# Patient Record
Sex: FEMALE | Race: WHITE | Employment: OTHER | URBAN - METROPOLITAN AREA
[De-identification: names, ages, dates, MRNs, and addresses within clinical notes are randomized per-mention and may not be internally consistent; named-entity substitution may affect disease eponyms.]

---

## 2017-06-17 ENCOUNTER — HOSPITAL ENCOUNTER (OUTPATIENT)
Dept: MAMMOGRAPHY | Age: 77
Discharge: HOME | End: 2017-06-17
Attending: INTERNAL MEDICINE
Payer: MEDICARE

## 2017-06-17 ENCOUNTER — HOSPITAL ENCOUNTER (OUTPATIENT)
Dept: BONE DENSITY | Age: 77
Discharge: HOME | End: 2017-06-17
Attending: INTERNAL MEDICINE
Payer: MEDICARE

## 2017-06-17 DIAGNOSIS — Z12.31 SCREENING MAMMOGRAM, ENCOUNTER FOR: ICD-10-CM

## 2017-06-17 DIAGNOSIS — M81.0 OSTEOPOROSIS: ICD-10-CM

## 2017-06-17 DIAGNOSIS — Z12.31 ENCOUNTER FOR SCREENING MAMMOGRAM FOR MALIGNANT NEOPLASM OF BREAST: ICD-10-CM

## 2017-06-17 PROCEDURE — 77063 BREAST TOMOSYNTHESIS BI: CPT | Performed by: INTERNAL MEDICINE

## 2017-06-17 PROCEDURE — 77067 SCR MAMMO BI INCL CAD: CPT | Performed by: INTERNAL MEDICINE

## 2017-06-17 PROCEDURE — 77080 DXA BONE DENSITY AXIAL: CPT | Performed by: INTERNAL MEDICINE

## 2017-06-19 NOTE — PROGRESS NOTES
Quick Note:    Please call  Her bone density is better and only shows osteopenia  Please have her stop the fosamax  Continue lifestyle with vitamin D and calcium  ______

## 2018-03-01 ENCOUNTER — OFFICE VISIT (OUTPATIENT)
Dept: FAMILY MEDICINE CLINIC | Facility: CLINIC | Age: 78
End: 2018-03-01

## 2018-03-01 VITALS
TEMPERATURE: 98 F | HEART RATE: 70 BPM | RESPIRATION RATE: 18 BRPM | SYSTOLIC BLOOD PRESSURE: 128 MMHG | DIASTOLIC BLOOD PRESSURE: 60 MMHG | OXYGEN SATURATION: 97 %

## 2018-03-01 DIAGNOSIS — S91.301A WOUND OF RIGHT FOOT: ICD-10-CM

## 2018-03-01 DIAGNOSIS — L84 CALLUS OF FOOT: Primary | ICD-10-CM

## 2018-03-01 PROCEDURE — 90471 IMMUNIZATION ADMIN: CPT | Performed by: PHYSICIAN ASSISTANT

## 2018-03-01 PROCEDURE — 90714 TD VACC NO PRESV 7 YRS+ IM: CPT | Performed by: PHYSICIAN ASSISTANT

## 2018-03-01 PROCEDURE — 99213 OFFICE O/P EST LOW 20 MIN: CPT | Performed by: PHYSICIAN ASSISTANT

## 2018-03-01 RX ORDER — CEPHALEXIN 500 MG/1
500 CAPSULE ORAL 2 TIMES DAILY
Qty: 14 CAPSULE | Refills: 0 | Status: SHIPPED | OUTPATIENT
Start: 2018-03-01 | End: 2018-03-08

## 2018-03-01 NOTE — PATIENT INSTRUCTIONS
-MUST follow up with podiatry as soon as possible  -Updated tetanus   -Will provide keflex, which is an antibiotic given wound and possible hardware in ankle. -Must go to ER with worsening pain, pus, blood, discharge.         Treating Corns and Calluses [address]. [address] [facility], [address] [place] [address]. All rights reserved. This information is not intended as a substitute for professional medical care. Always follow your healthcare professional's instructions.

## 2018-03-01 NOTE — PROGRESS NOTES
CHIEF COMPLAINT:   Patient presents with:  Callus: pt c\o of callus on rt foot       HPI:     Christopher Song is a 65year old female who presents with concerns of wound to her right bottom foot for 14 days.   Patient explains she has had a callous on her f daily. Disp: 90 capsule Rfl: 3   Calcium 600 MG Oral Tab Take 2 tabs daily for a total of 1,200 mg daily Disp: 2 tablet Rfl: 0   WOMENS ONE DAILY OR TABS 1 TABLET DAILY Disp:  Rfl:       Past Medical History:   Diagnosis Date   • Disorder of bone and carti Skin care discussed with patient.  -Discussed with grandson and patient that patient needs to be seeing the podiatrist.  -Updated tetanus   - Although lower suspicion for true bacterial infection, given ongoing pain and minimal swelling/erythema and possib parts of your foot may be under too much pressure. This can cause severe corns and calluses. In such cases, surgery may be the best way to correct the problem.   Outpatient procedures  In most cases, surgery to improve bone position is an outpatient procedu

## 2020-03-31 PROBLEM — L97.519 ULCER OF RIGHT FOOT, UNSPECIFIED ULCER STAGE (HCC): Status: ACTIVE | Noted: 2020-03-31

## 2020-03-31 PROBLEM — E66.01 MORBID OBESITY WITH BODY MASS INDEX (BMI) OF 40.0 TO 44.9 IN ADULT (HCC): Status: ACTIVE | Noted: 2020-03-31

## 2021-04-07 PROBLEM — L97.519 ULCER OF RIGHT FOOT, UNSPECIFIED ULCER STAGE (HCC): Status: RESOLVED | Noted: 2020-03-31 | Resolved: 2021-04-07

## 2021-06-08 ENCOUNTER — APPOINTMENT (OUTPATIENT)
Dept: GENERAL RADIOLOGY | Age: 81
End: 2021-06-08
Attending: EMERGENCY MEDICINE
Payer: MEDICARE

## 2021-06-08 ENCOUNTER — HOSPITAL ENCOUNTER (EMERGENCY)
Age: 81
Discharge: HOME | End: 2021-06-08
Attending: EMERGENCY MEDICINE
Payer: MEDICARE

## 2021-06-08 ENCOUNTER — APPOINTMENT (OUTPATIENT)
Dept: CT IMAGING | Age: 81
End: 2021-06-08
Attending: EMERGENCY MEDICINE
Payer: MEDICARE

## 2021-06-08 VITALS
SYSTOLIC BLOOD PRESSURE: 138 MMHG | HEART RATE: 80 BPM | BODY MASS INDEX: 46.07 KG/M2 | HEIGHT: 63 IN | DIASTOLIC BLOOD PRESSURE: 55 MMHG | TEMPERATURE: 99 F | OXYGEN SATURATION: 98 % | RESPIRATION RATE: 16 BRPM | WEIGHT: 260 LBS

## 2021-06-08 DIAGNOSIS — S22.31XA CLOSED FRACTURE OF ONE RIB OF RIGHT SIDE, INITIAL ENCOUNTER: ICD-10-CM

## 2021-06-08 DIAGNOSIS — S00.03XA CONTUSION OF SCALP, INITIAL ENCOUNTER: ICD-10-CM
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
## 2021-06-08 PROCEDURE — 71101 X-RAY EXAM UNILAT RIBS/CHEST: CPT | Performed by: EMERGENCY MEDICINE

## 2021-06-08 PROCEDURE — 70450 CT HEAD/BRAIN W/O DYE: CPT | Performed by: EMERGENCY MEDICINE

## 2021-06-08 PROCEDURE — 99284 EMERGENCY DEPT VISIT MOD MDM: CPT

## 2021-06-08 RX ORDER — LIDOCAINE 4 G/G
1 PATCH TOPICAL EVERY 24 HOURS
Qty: 10 PATCH | Refills: 0 | Status: ON HOLD | OUTPATIENT
Start: 2021-06-08 | End: 2022-01-03

## 2021-06-09 NOTE — ED INITIAL ASSESSMENT (HPI)
Pt in er for evaluation post fall today, reports bruising to her head, right arm, and pain to her right ribs

## 2021-06-09 NOTE — ED PROVIDER NOTES
Patient Seen in: THE [facility] Emergency Department In [place]      History   Patient presents with:  Fall    Stated Complaint: fall, head injury, rib injury    HPI/Subjective:   HPI    Patient presents after a mechanical fall earlier today.   She apparently Device None (Room air)       Current:/57   Pulse 76   Temp 98.8 °F (37.1 °C) (Oral)   Resp 20   Ht 160 cm (5' 3\")   Wt 117.9 kg   SpO2 96%   BMI 46.06 kg/m²         Physical Exam    General: Well-developed, severely overweight, (BMI over 45); minima Impression:  Fall, initial encounter  (primary encounter diagnosis)  Contusion of scalp, initial encounter  Closed fracture of one rib of right side, initial encounter     Disposition:  Discharge  6/8/2021 10:28 pm    Follow-up:  No follow-up provider spec

## 2022-01-02 ENCOUNTER — APPOINTMENT (OUTPATIENT)
Dept: GENERAL RADIOLOGY | Facility: HOSPITAL | Age: 82
DRG: 871 | End: 2022-01-02
Attending: EMERGENCY MEDICINE
Payer: MEDICARE

## 2022-01-02 ENCOUNTER — HOSPITAL ENCOUNTER (INPATIENT)
Facility: HOSPITAL | Age: 82
LOS: 8 days | Discharge: NURSING, REHAB OR LONG-TERM CARE | DRG: 871 | End: 2022-01-11
Attending: EMERGENCY MEDICINE | Admitting: HOSPITALIST
Payer: MEDICARE

## 2022-01-02 DIAGNOSIS — I95.9 HYPOTENSION, UNSPECIFIED HYPOTENSION TYPE: Primary | ICD-10-CM

## 2022-01-02 DIAGNOSIS — U07.1 COVID-19: ICD-10-CM

## 2022-01-02 LAB
ALBUMIN SERPL-MCNC: 2.8 G/DL (ref 3.4–5)
ALBUMIN/GLOB SERPL: 0.5 {RATIO} (ref 1–2)
ALP LIVER SERPL-CCNC: 148 U/L
ALT SERPL-CCNC: 43 U/L
ANION GAP SERPL CALC-SCNC: 10 MMOL/L (ref 0–18)
AST SERPL-CCNC: 138 U/L (ref 15–37)
BASOPHILS # BLD AUTO: 0.01 X10(3) UL (ref 0–0.2)
BASOPHILS NFR BLD AUTO: 0.2 %
BILIRUB SERPL-MCNC: 0.7 MG/DL (ref 0.1–2)
BILIRUB UR QL CFM: NEGATIVE
BUN BLD-MCNC: 29 MG/DL (ref 7–18)
CALCIUM BLD-MCNC: 9.4 MG/DL (ref 8.5–10.1)
CHLORIDE SERPL-SCNC: 93 MMOL/L (ref 98–112)
CLARITY UR REFRACT.AUTO: CLEAR
CO2 SERPL-SCNC: 25 MMOL/L (ref 21–32)
CREAT BLD-MCNC: 1.97 MG/DL
EOSINOPHIL # BLD AUTO: 0 X10(3) UL (ref 0–0.7)
EOSINOPHIL NFR BLD AUTO: 0 %
ERYTHROCYTE [DISTWIDTH] IN BLOOD BY AUTOMATED COUNT: 14.2 %
GLOBULIN PLAS-MCNC: 5.2 G/DL (ref 2.8–4.4)
GLUCOSE BLD-MCNC: 98 MG/DL (ref 70–99)
GLUCOSE UR STRIP.AUTO-MCNC: NEGATIVE MG/DL
HCT VFR BLD AUTO: 44.7 %
HGB BLD-MCNC: 15 G/DL
IMM GRANULOCYTES # BLD AUTO: 0.03 X10(3) UL (ref 0–1)
IMM GRANULOCYTES NFR BLD: 0.6 %
KETONES UR STRIP.AUTO-MCNC: NEGATIVE MG/DL
LACTATE SERPL-SCNC: 3 MMOL/L (ref 0.4–2)
LEUKOCYTE ESTERASE UR QL STRIP.AUTO: NEGATIVE
LYMPHOCYTES # BLD AUTO: 1.08 X10(3) UL (ref 1–4)
LYMPHOCYTES NFR BLD AUTO: 19.9 %
MCH RBC QN AUTO: 30.7 PG (ref 26–34)
MCHC RBC AUTO-ENTMCNC: 33.6 G/DL (ref 31–37)
MCV RBC AUTO: 91.6 FL
MONOCYTES # BLD AUTO: 0.6 X10(3) UL (ref 0.1–1)
MONOCYTES NFR BLD AUTO: 11 %
NEUTROPHILS # BLD AUTO: 3.72 X10 (3) UL (ref 1.5–7.7)
NEUTROPHILS # BLD AUTO: 3.72 X10(3) UL (ref 1.5–7.7)
NEUTROPHILS NFR BLD AUTO: 68.3 %
NITRITE UR QL STRIP.AUTO: NEGATIVE
OSMOLALITY SERPL CALC.SUM OF ELEC: 272 MOSM/KG (ref 275–295)
PH UR STRIP.AUTO: 5 [PH] (ref 5–8)
PLATELET # BLD AUTO: 163 10(3)UL (ref 150–450)
POTASSIUM SERPL-SCNC: 4.4 MMOL/L (ref 3.5–5.1)
PROT SERPL-MCNC: 8 G/DL (ref 6.4–8.2)
PROT UR STRIP.AUTO-MCNC: NEGATIVE MG/DL
RBC # BLD AUTO: 4.88 X10(6)UL
RBC UR QL AUTO: NEGATIVE
SARS-COV-2 RNA RESP QL NAA+PROBE: DETECTED
SODIUM SERPL-SCNC: 128 MMOL/L (ref 136–145)
SP GR UR STRIP.AUTO: 1.02 (ref 1–1.03)
TROPONIN I HIGH SENSITIVITY: 23 NG/L
UROBILINOGEN UR STRIP.AUTO-MCNC: 4 MG/DL
WBC # BLD AUTO: 5.4 X10(3) UL (ref 4–11)

## 2022-01-02 PROCEDURE — 71045 X-RAY EXAM CHEST 1 VIEW: CPT | Performed by: EMERGENCY MEDICINE

## 2022-01-03 ENCOUNTER — APPOINTMENT (OUTPATIENT)
Dept: ULTRASOUND IMAGING | Facility: HOSPITAL | Age: 82
DRG: 871 | End: 2022-01-03
Attending: NURSE PRACTITIONER
Payer: MEDICARE

## 2022-01-03 ENCOUNTER — APPOINTMENT (OUTPATIENT)
Dept: CT IMAGING | Facility: HOSPITAL | Age: 82
DRG: 871 | End: 2022-01-03
Attending: EMERGENCY MEDICINE
Payer: MEDICARE

## 2022-01-03 PROBLEM — I95.9 HYPOTENSION, UNSPECIFIED HYPOTENSION TYPE: Status: ACTIVE | Noted: 2022-01-03

## 2022-01-03 PROBLEM — U07.1 COVID-19: Status: ACTIVE | Noted: 2022-01-03

## 2022-01-03 PROBLEM — I95.9 HYPOTENSION: Status: ACTIVE | Noted: 2022-01-03

## 2022-01-03 LAB
ALBUMIN SERPL-MCNC: 2.2 G/DL (ref 3.4–5)
ALBUMIN/GLOB SERPL: 0.6 {RATIO} (ref 1–2)
ALP LIVER SERPL-CCNC: 120 U/L
ALT SERPL-CCNC: 34 U/L
ANION GAP SERPL CALC-SCNC: 7 MMOL/L (ref 0–18)
APTT PPP: 183.8 SECONDS (ref 23.3–35.6)
APTT PPP: 33.2 SECONDS (ref 23.3–35.6)
APTT PPP: >300 SECONDS (ref 23.3–35.6)
APTT PPP: >300 SECONDS (ref 23.3–35.6)
AST SERPL-CCNC: 111 U/L (ref 15–37)
BASOPHILS # BLD AUTO: 0.01 X10(3) UL (ref 0–0.2)
BASOPHILS NFR BLD AUTO: 0.2 %
BILIRUB SERPL-MCNC: 0.5 MG/DL (ref 0.1–2)
BUN BLD-MCNC: 26 MG/DL (ref 7–18)
CALCIUM BLD-MCNC: 7.9 MG/DL (ref 8.5–10.1)
CHLORIDE SERPL-SCNC: 101 MMOL/L (ref 98–112)
CK SERPL-CCNC: 257 U/L
CO2 SERPL-SCNC: 22 MMOL/L (ref 21–32)
CREAT BLD-MCNC: 1.44 MG/DL
CRP SERPL-MCNC: 4.99 MG/DL (ref ?–0.3)
CRP SERPL-MCNC: 5.04 MG/DL (ref ?–0.3)
D DIMER PPP FEU-MCNC: 1.17 UG/ML FEU (ref ?–0.82)
D DIMER PPP FEU-MCNC: 1.53 UG/ML FEU (ref ?–0.82)
DEPRECATED HBV CORE AB SER IA-ACNC: 638.7 NG/ML
DEPRECATED HBV CORE AB SER IA-ACNC: 657.8 NG/ML
EOSINOPHIL # BLD AUTO: 0 X10(3) UL (ref 0–0.7)
EOSINOPHIL NFR BLD AUTO: 0 %
ERYTHROCYTE [DISTWIDTH] IN BLOOD BY AUTOMATED COUNT: 14 %
GLOBULIN PLAS-MCNC: 3.8 G/DL (ref 2.8–4.4)
GLUCOSE BLD-MCNC: 98 MG/DL (ref 70–99)
HCT VFR BLD AUTO: 38.1 %
HGB BLD-MCNC: 12.7 G/DL
IMM GRANULOCYTES # BLD AUTO: 0.02 X10(3) UL (ref 0–1)
IMM GRANULOCYTES NFR BLD: 0.5 %
INR BLD: 1.11 (ref 0.8–1.2)
L PNEUMO AG UR QL: NEGATIVE
LACTATE SERPL-SCNC: 1.2 MMOL/L (ref 0.4–2)
LDH SERPL L TO P-CCNC: 317 U/L
LDH SERPL L TO P-CCNC: 317 U/L
LYMPHOCYTES # BLD AUTO: 1.41 X10(3) UL (ref 1–4)
LYMPHOCYTES NFR BLD AUTO: 34.6 %
MCH RBC QN AUTO: 30.9 PG (ref 26–34)
MCHC RBC AUTO-ENTMCNC: 33.3 G/DL (ref 31–37)
MCV RBC AUTO: 92.7 FL
MONOCYTES # BLD AUTO: 0.54 X10(3) UL (ref 0.1–1)
MONOCYTES NFR BLD AUTO: 13.3 %
MRSA DNA SPEC QL NAA+PROBE: NEGATIVE
NEUTROPHILS # BLD AUTO: 2.09 X10 (3) UL (ref 1.5–7.7)
NEUTROPHILS # BLD AUTO: 2.09 X10(3) UL (ref 1.5–7.7)
NEUTROPHILS NFR BLD AUTO: 51.4 %
NT-PROBNP SERPL-MCNC: 408 PG/ML (ref ?–450)
OSMOLALITY SERPL CALC.SUM OF ELEC: 275 MOSM/KG (ref 275–295)
PLATELET # BLD AUTO: 125 10(3)UL (ref 150–450)
POTASSIUM SERPL-SCNC: 4.2 MMOL/L (ref 3.5–5.1)
PROCALCITONIN SERPL-MCNC: 1.29 NG/ML (ref ?–0.16)
PROT SERPL-MCNC: 6 G/DL (ref 6.4–8.2)
PROTHROMBIN TIME: 14.3 SECONDS (ref 11.6–14.8)
RBC # BLD AUTO: 4.11 X10(6)UL
SODIUM SERPL-SCNC: 130 MMOL/L (ref 136–145)
STREP PNEUMO ANTIGEN, URINE: NEGATIVE
VANCOMYCIN PEAK SERPL-MCNC: 23.8 UG/ML (ref 30–50)
WBC # BLD AUTO: 4.1 X10(3) UL (ref 4–11)

## 2022-01-03 PROCEDURE — 5A0945A ASSISTANCE WITH RESPIRATORY VENTILATION, 24-96 CONSECUTIVE HOURS, HIGH NASAL FLOW/VELOCITY: ICD-10-PCS | Performed by: HOSPITALIST

## 2022-01-03 PROCEDURE — XW033E5 INTRODUCTION OF REMDESIVIR ANTI-INFECTIVE INTO PERIPHERAL VEIN, PERCUTANEOUS APPROACH, NEW TECHNOLOGY GROUP 5: ICD-10-PCS | Performed by: INTERNAL MEDICINE

## 2022-01-03 PROCEDURE — 70450 CT HEAD/BRAIN W/O DYE: CPT | Performed by: EMERGENCY MEDICINE

## 2022-01-03 PROCEDURE — 93970 EXTREMITY STUDY: CPT | Performed by: NURSE PRACTITIONER

## 2022-01-03 PROCEDURE — 99291 CRITICAL CARE FIRST HOUR: CPT | Performed by: NURSE PRACTITIONER

## 2022-01-03 RX ORDER — HEPARIN SODIUM AND DEXTROSE 10000; 5 [USP'U]/100ML; G/100ML
18 INJECTION INTRAVENOUS ONCE
Status: DISCONTINUED | OUTPATIENT
Start: 2022-01-03 | End: 2022-01-03

## 2022-01-03 RX ORDER — HEPARIN SODIUM AND DEXTROSE 10000; 5 [USP'U]/100ML; G/100ML
INJECTION INTRAVENOUS CONTINUOUS
Status: DISCONTINUED | OUTPATIENT
Start: 2022-01-03 | End: 2022-01-03

## 2022-01-03 RX ORDER — VANCOMYCIN/0.9 % SOD CHLORIDE 1.75 G/5
25 PLASTIC BAG, INJECTION (ML) INTRAVENOUS ONCE
Status: COMPLETED | OUTPATIENT
Start: 2022-01-03 | End: 2022-01-03

## 2022-01-03 RX ORDER — OXYBUTYNIN CHLORIDE 5 MG/1
15 TABLET, EXTENDED RELEASE ORAL DAILY
Status: DISCONTINUED | OUTPATIENT
Start: 2022-01-03 | End: 2022-01-11

## 2022-01-03 RX ORDER — ONDANSETRON 2 MG/ML
4 INJECTION INTRAMUSCULAR; INTRAVENOUS EVERY 6 HOURS PRN
Status: DISCONTINUED | OUTPATIENT
Start: 2022-01-03 | End: 2022-01-11

## 2022-01-03 RX ORDER — SODIUM CHLORIDE 9 MG/ML
1000 INJECTION, SOLUTION INTRAVENOUS ONCE
Status: COMPLETED | OUTPATIENT
Start: 2022-01-03 | End: 2022-01-03

## 2022-01-03 RX ORDER — SALIVA STIMULANT COMB. NO.3
SPRAY, NON-AEROSOL (ML) MUCOUS MEMBRANE AS NEEDED
Status: DISCONTINUED | OUTPATIENT
Start: 2022-01-03 | End: 2022-01-11

## 2022-01-03 RX ORDER — LEVOTHYROXINE SODIUM 0.1 MG/1
100 TABLET ORAL DAILY
Status: DISCONTINUED | OUTPATIENT
Start: 2022-01-03 | End: 2022-01-11

## 2022-01-03 RX ORDER — BENZONATATE 200 MG/1
200 CAPSULE ORAL 3 TIMES DAILY PRN
Status: DISCONTINUED | OUTPATIENT
Start: 2022-01-03 | End: 2022-01-11

## 2022-01-03 RX ORDER — ACETAMINOPHEN 325 MG/1
650 TABLET ORAL EVERY 6 HOURS PRN
Status: DISCONTINUED | OUTPATIENT
Start: 2022-01-03 | End: 2022-01-11

## 2022-01-03 RX ORDER — PANTOPRAZOLE SODIUM 20 MG/1
20 TABLET, DELAYED RELEASE ORAL
Refills: 3 | Status: DISCONTINUED | OUTPATIENT
Start: 2022-01-04 | End: 2022-01-11

## 2022-01-03 RX ORDER — HEPARIN SODIUM 5000 [USP'U]/ML
5000 INJECTION, SOLUTION INTRAVENOUS; SUBCUTANEOUS EVERY 8 HOURS SCHEDULED
Status: DISCONTINUED | OUTPATIENT
Start: 2022-01-03 | End: 2022-01-03

## 2022-01-03 RX ORDER — HEPARIN SODIUM AND DEXTROSE 10000; 5 [USP'U]/100ML; G/100ML
INJECTION INTRAVENOUS CONTINUOUS
Status: DISCONTINUED | OUTPATIENT
Start: 2022-01-03 | End: 2022-01-05

## 2022-01-03 RX ORDER — HEPARIN SODIUM AND DEXTROSE 10000; 5 [USP'U]/100ML; G/100ML
18 INJECTION INTRAVENOUS ONCE
Status: DISCONTINUED | OUTPATIENT
Start: 2022-01-03 | End: 2022-01-05

## 2022-01-03 RX ORDER — DULOXETIN HYDROCHLORIDE 30 MG/1
60 CAPSULE, DELAYED RELEASE ORAL DAILY
Status: DISCONTINUED | OUTPATIENT
Start: 2022-01-03 | End: 2022-01-11

## 2022-01-03 RX ORDER — ALBUTEROL SULFATE 90 UG/1
4 AEROSOL, METERED RESPIRATORY (INHALATION) EVERY 4 HOURS PRN
Status: DISCONTINUED | OUTPATIENT
Start: 2022-01-03 | End: 2022-01-11

## 2022-01-03 NOTE — PROGRESS NOTES
ICU  Critical Care APRN Progress Note    NAME: Jenise Gosselin - ROOM: 98999-D - MRN: [id number] - Age: 79year old - :1940    History Of Present Illness:  Jenise Gosselin is a 79year old female with PMHx significant for HTN, hypothyroidism and os auscultation bilaterally, respirations unlabored  Heart: Regular rate and rhythm, S1 and S2 normal, no murmur, rub or gallop  Abdomen: Soft, non-tender, bowel sounds active all four quadrants, no masses, no organomegaly  Extremities: Extremities normal, at consult  -Hold on steroids with normal oxygenation  -Symptoms started 10 days ago  -Monitor inflammatory markers    3. Elevated Ddimer  -Consider CTA  -Heparin gtt    4.  NAJMA  -Likely from dehydration from poor PO intake  -IVF bolus given in ED  -Monitor uo

## 2022-01-03 NOTE — ED PROVIDER NOTES
Patient Seen in: [facility] Emergency Department      History   Patient presents with:  Dehydration    Stated Complaint: possible exposure to covid, fever 99.6, weakness, started before estephania. vern*    Subjective:   HPI    Patient is an 81-year-o Pulse 61   Temp 98.4 °F (36.9 °C) (Oral)   Resp 26   Ht 162.6 cm (5' 4\")   Wt 104.3 kg   SpO2 99%   BMI 39.48 kg/m²         Physical Exam      Constitutional: Awake, alert, age appearing, non-toxic  Head: Normocephalic and atraumatic.      Eyes: EOM are n Notable for the following components:    C-Reactive Protein 4.99 (*)     All other components within normal limits   FERRITIN - Abnormal; Notable for the following components:    Ferritin 657.8 (*)     All other components within normal limits   PROCALCITO significant NAJMA with her creatinine doubled from baseline. At this point, will hold off on doing CT angiogram (D-dimer was not ordered to restratify for PE as it is). Admitting team to assess need for CT angiogram tomorrow, based on her kidney status. 10 of fevers, weakness and several days of confusion now. She is Covid positive. Hypotensive on arrival but fluid responsive. Suspect that her hypotension is largely from being volume depleted but  septic shock not ruled out.       Given her Covid posi

## 2022-01-03 NOTE — ED INITIAL ASSESSMENT (HPI)
Pt states low pulse ox at home, loose stools, pt loosing control of bladder. Pt with fevers at home. Family noting foul urine odor.

## 2022-01-03 NOTE — PROGRESS NOTES
[facility] Dosing Service    Initial Pharmacokinetic Consult for Vancomycin AUC Dosing    Marybel Tuttle is a 79year old patient who is being treated for pneumonia. Pharmacy has been asked to dose vancomycin by Israel Moraes.     Weights:  Ideal body

## 2022-01-03 NOTE — PLAN OF CARE
Assumed care of pt after RN report. Alert x3, confused on time. Remains on room air. Denies any difficulties breathing or chest pain. Only complaint is a sore throat, lozenges and biotene spray PRN. VSS. Afib, rate controlled. Tolerating diet.  Incontinent

## 2022-01-03 NOTE — H&P
General Medicine H&P     Patient presents with:  Dehydration       PCP: Dipika Cueva MD    History of Present Illness: Patient is a 79year old female with PMH including but not limited to HTN, HT who p/t [facility] ED c fever, weakness, found ot have covi anicteric, EOMs intact. Nose: Nares normal,  Mucosa normal    Throat: Lips normal   Neck: Supple, symmetrical, trachea midline   Lungs:   Clear to auscultation bilaterally.  Normal effort   Chest wall:  No tenderness or deformity   Heart:  Regular rate a fever, COVID+    FINDINGS:  Mild diffuse volume loss. There is no significant parenchymal attenuation abnormality. There is no midline shift or mass-effect. The basal cisterns are patent. The gray-white matter differentiation is intact.   There is no ac with other etiologies not  entirely excluded. Clinical correlation recommended. Possible small left pleural effusion.      Dictated by (CST): Dejuan Cr MD on 1/02/2022 at 11:50 PM     Finalized by (CST): Dejuan Cr MD on 1/02/2022 at 11:50 PM

## 2022-01-03 NOTE — PLAN OF CARE
Received pt from ED AOX 2 to self, place and situation. Pt on 2L oxygen via nasal cannula later transitioned to room air. Pt on fluids 125 ml/hr later discontinued by APN. On heparin drip 1800 unit/kg/hr ( 18ml),  Vanco and Azithromycin given per MAR.   Pt period  Description: INTERVENTIONS  - Monitor WBC  - Administer growth factors as ordered  - Implement neutropenic guidelines  Outcome: Not Progressing

## 2022-01-03 NOTE — PROGRESS NOTES
Pharmacy note re: Zosyn dose adjustment for obesity:    The Dose of Zosyn was adjusted from 3.375 gm to 4.5 gm as patient's body wt > 100 kg. Actual wt= 104 kg. Thank you,  Mallory Cr, Pharm.  D  X [phone]

## 2022-01-03 NOTE — PROGRESS NOTES
[facility] Pharmacy Note:  Renal Adjustment for piperacillin/tazobactam (ZOSYN)    Oliva Yao is a 79year old patient who has been prescribed piperacillin/tazobactam (ZOSYN) 3.375 g every12 hrs.   The estimated creatinine clearance is 26 mL/min (A) (based on

## 2022-01-03 NOTE — ED QUICK NOTES
Pt returned to room from CT, she is resting in bed. Pt denies pain, she denies ED, \"it's just this mask that makes it hard. \" Pt alert, oriented x3. States she gets up with a walker or cane at home. Pt denies need at this time.  No cough noted

## 2022-01-03 NOTE — PROGRESS NOTES
[facility] Dosing Service    Initial Pharmacokinetic Consult for Vancomycin AUC Dosing    Jeferson Ryan is a 79year old patient who is being treated for pneumonia. Pharmacy has been asked to dose vancomycin by Scot Fontenot.     Weights:  Ideal body

## 2022-01-03 NOTE — CONSULTS
[facility]  TEL: [phone]  FAX: [phone]    Alma Heller Patient Status:  Inpatient    1940 MRN [id number]   [facility] 4SW-A Attending Brooke Nelson,*   Hosp Day # 0 PCP Eder Pan DOSE, 18 Units/kg/hr, Intravenous, Once  •  heparin (PORCINE) drip 13817lkmcg/250mL infusion CONTINUOUS, 200-3,000 Units/hr, Intravenous, Continuous  •  azithromycin (ZITHROMAX) 500 mg in sodium chloride 0.9% 250 mL IVPB, 500 mg, Intravenous, Q24H  •  vaso bruits. Respiratory: Clear to auscultation bilaterally. No wheezes. No rhonchi. Cardiovascular: S1, S2.  Regular rate and rhythm. No murmurs. Abdomen: Soft, nontender, nondistended. Positive bowel sounds.   Musculoskeletal: Full range of motion of all steroids. Still feels fatigued. Some sore throat mainly is her complaint. Was hypotensive initially. This seems to be proved. On heparin drip. ddimer 1.17    ASSESSMENT:    #Covid pneumonia. Mild hypoxemia on presentation. Ferritin 638.   Partially v

## 2022-01-03 NOTE — CONSULTS
[facility] Patient Status:  Inpatient    1940 MRN [id number]   [facility] 4SW-A Attending Wang Hayden   Hosp Day # 0 PCP Navneet Ye MD     Date of Admission: 2022  Admission Diagnosis: daily., Disp: 90 tablet, Rfl: 3  DULoxetine HCl 60 MG Oral Cap DR Particles, Take 1 capsule (60 mg total) by mouth daily. , Disp: 90 capsule, Rfl: 3  amLODIPine Besy-Benazepril HCl 5-10 MG Oral Cap, Take 1 capsule by mouth daily. , Disp: 90 capsule, Rfl: 3 palpitations, edema, orthopnea, or syncope  Respiratory: denies SOB, dyspnea on exertion, denies cough, hemoptysis, wheezing, pleurisy  GI: denies nausea, vomiting, diarrhea, constipation, melena, abdominal pain  : denies hematuria, dysuria, hesitancy, o RBC 4.11 01/03/2022    HGB 12.7 01/03/2022    HCT 38.1 01/03/2022    MCV 92.7 01/03/2022    MCH 30.9 01/03/2022    MCHC 33.3 01/03/2022    RDW 14.0 01/03/2022    .0 01/03/2022     Lab Results   Component Value Date     01/03/2022    K 4.2 01/0 4. NAJMA: prerenal  - improving with IVF   5. Proph:   - lovenox  6.  Dispo:  - will follow   - stable to transfer to the floor     Nick Bradford MD  1/3/2022  12:25 PM

## 2022-01-03 NOTE — ED QUICK NOTES
Spoke with daughter Hany Wood via phone who states patient has had low grade fever on and off since Jeovanny. Daughter and grandson that live with patient have also been sick. Family also states that patient fell 2 days ago and hit her head, no LOC.   Medics d

## 2022-01-03 NOTE — PROGRESS NOTES
[facility] Pharmacy Note: Antimicrobial Weight Based & Renal Dose Adjustment for: piperacillin/tazobactam (Dudley Nava)    Kate Aguayo is a 79year old patient who has been prescribed piperacillin/tazobactam (ZOSYN) 4.5 gm every 8 hours.     Estimated Creatinine Nish

## 2022-01-04 ENCOUNTER — APPOINTMENT (OUTPATIENT)
Dept: CT IMAGING | Facility: HOSPITAL | Age: 82
DRG: 871 | End: 2022-01-04
Attending: INTERNAL MEDICINE
Payer: MEDICARE

## 2022-01-04 LAB
ALBUMIN SERPL-MCNC: 2.2 G/DL (ref 3.4–5)
ALBUMIN/GLOB SERPL: 0.6 {RATIO} (ref 1–2)
ALP LIVER SERPL-CCNC: 123 U/L
ALT SERPL-CCNC: 30 U/L
ANION GAP SERPL CALC-SCNC: 8 MMOL/L (ref 0–18)
APTT PPP: 115 SECONDS (ref 23.3–35.6)
APTT PPP: 233.7 SECONDS (ref 23.3–35.6)
APTT PPP: 84.9 SECONDS (ref 23.3–35.6)
AST SERPL-CCNC: 92 U/L (ref 15–37)
BASOPHILS # BLD AUTO: 0.02 X10(3) UL (ref 0–0.2)
BASOPHILS NFR BLD AUTO: 0.5 %
BILIRUB SERPL-MCNC: 0.5 MG/DL (ref 0.1–2)
BUN BLD-MCNC: 19 MG/DL (ref 7–18)
CALCIUM BLD-MCNC: 8 MG/DL (ref 8.5–10.1)
CHLORIDE SERPL-SCNC: 104 MMOL/L (ref 98–112)
CO2 SERPL-SCNC: 23 MMOL/L (ref 21–32)
CREAT BLD-MCNC: 0.89 MG/DL
CRP SERPL-MCNC: 5.41 MG/DL (ref ?–0.3)
D DIMER PPP FEU-MCNC: 0.85 UG/ML FEU (ref ?–0.82)
DEPRECATED HBV CORE AB SER IA-ACNC: 518.2 NG/ML
EOSINOPHIL # BLD AUTO: 0.03 X10(3) UL (ref 0–0.7)
EOSINOPHIL NFR BLD AUTO: 0.8 %
ERYTHROCYTE [DISTWIDTH] IN BLOOD BY AUTOMATED COUNT: 14.1 %
GLOBULIN PLAS-MCNC: 3.9 G/DL (ref 2.8–4.4)
GLUCOSE BLD-MCNC: 66 MG/DL (ref 70–99)
GLUCOSE BLD-MCNC: 67 MG/DL (ref 70–99)
GLUCOSE BLD-MCNC: 89 MG/DL (ref 70–99)
GLUCOSE BLD-MCNC: 92 MG/DL (ref 70–99)
GLUCOSE BLD-MCNC: 99 MG/DL (ref 70–99)
HAV IGM SER QL: NONREACTIVE
HBV CORE IGM SER QL: NONREACTIVE
HBV SURFACE AG SERPL QL IA: NONREACTIVE
HCT VFR BLD AUTO: 40.2 %
HCV AB SERPL QL IA: NONREACTIVE
HGB BLD-MCNC: 13.1 G/DL
IMM GRANULOCYTES # BLD AUTO: 0.03 X10(3) UL (ref 0–1)
IMM GRANULOCYTES NFR BLD: 0.8 %
LDH SERPL L TO P-CCNC: 331 U/L
LYMPHOCYTES # BLD AUTO: 1.28 X10(3) UL (ref 1–4)
LYMPHOCYTES NFR BLD AUTO: 32 %
MAGNESIUM SERPL-MCNC: 1.6 MG/DL (ref 1.6–2.6)
MCH RBC QN AUTO: 29.8 PG (ref 26–34)
MCHC RBC AUTO-ENTMCNC: 32.6 G/DL (ref 31–37)
MCV RBC AUTO: 91.4 FL
MONOCYTES # BLD AUTO: 0.33 X10(3) UL (ref 0.1–1)
MONOCYTES NFR BLD AUTO: 8.3 %
NEUTROPHILS # BLD AUTO: 2.31 X10 (3) UL (ref 1.5–7.7)
NEUTROPHILS # BLD AUTO: 2.31 X10(3) UL (ref 1.5–7.7)
NEUTROPHILS NFR BLD AUTO: 57.6 %
OSMOLALITY SERPL CALC.SUM OF ELEC: 281 MOSM/KG (ref 275–295)
PHOSPHATE SERPL-MCNC: 2.4 MG/DL (ref 2.5–4.9)
PLATELET # BLD AUTO: 157 10(3)UL (ref 150–450)
POTASSIUM SERPL-SCNC: 4.1 MMOL/L (ref 3.5–5.1)
PROT SERPL-MCNC: 6.1 G/DL (ref 6.4–8.2)
RBC # BLD AUTO: 4.4 X10(6)UL
SODIUM SERPL-SCNC: 135 MMOL/L (ref 136–145)
WBC # BLD AUTO: 4 X10(3) UL (ref 4–11)

## 2022-01-04 PROCEDURE — 71275 CT ANGIOGRAPHY CHEST: CPT | Performed by: INTERNAL MEDICINE

## 2022-01-04 PROCEDURE — 3E0333Z INTRODUCTION OF ANTI-INFLAMMATORY INTO PERIPHERAL VEIN, PERCUTANEOUS APPROACH: ICD-10-PCS | Performed by: INTERNAL MEDICINE

## 2022-01-04 PROCEDURE — XW0DXM6 INTRODUCTION OF BARICITINIB INTO MOUTH AND PHARYNX, EXTERNAL APPROACH, NEW TECHNOLOGY GROUP 6: ICD-10-PCS | Performed by: INTERNAL MEDICINE

## 2022-01-04 RX ORDER — GUAIFENESIN 600 MG
600 TABLET, EXTENDED RELEASE 12 HR ORAL 2 TIMES DAILY
Status: DISCONTINUED | OUTPATIENT
Start: 2022-01-04 | End: 2022-01-11

## 2022-01-04 RX ORDER — DEXAMETHASONE SODIUM PHOSPHATE 4 MG/ML
6 VIAL (ML) INJECTION EVERY 24 HOURS
Status: DISCONTINUED | OUTPATIENT
Start: 2022-01-04 | End: 2022-01-08

## 2022-01-04 NOTE — PAYOR COMM NOTE
--------------  CONTINUED STAY REVIEW    Payor: Gideon Salas [facility] PPO  Subscriber #:  [id number]  Authorization Number: [id number]    Admit date: 1/3/22  Admit time:  3:06 AM    Admitting Physician: Carlos Eduardo Green MD  Attending Physician:  Julio Cesar Nina Given 100 mcg Oral Jose Brooks RN      Oxybutynin Chloride ER (DITROPAN-XL) 24 hr tab 15 mg     Date Action Dose Route User    1/4/2022 0902 Given 15 mg Oral Gucci Hooper RN    1/3/2022 1554 Given 15 mg Oral Jose Brooks RN      pantoprazole 59 25 112/51 92 % — — — CAROL    01/03/22 0900 — 61 23 104/47 93 % — — — CAROL    01/03/22 0800 99.5 °F (37.5 °C) 59 23 102/46 92 % — None (Room air) — CAROL    01/03/22 0700 — 67 21 95/45 91 % — — — CAROL    01/03/22 0600 — 63 27 100/57 95 % — — — EN    01/03/22 0500 acetaminophen (TYLENOL) tab 650 mg, 650 mg, Oral, Q6H PRN  •  ondansetron (ZOFRAN) injection 4 mg, 4 mg, Intravenous, Q6H PRN  •  heparin(PORCINE) 25230rqaod/250mL infusion INITIAL DOSE, 18 Units/kg/hr, Intravenous, Once  •  heparin (PORCINE) drip 33789guo 4.0 01/04/2022     RBC 4.40 01/04/2022     HGB 13.1 01/04/2022     HCT 40.2 01/04/2022     MCV 91.4 01/04/2022     MCH 29.8 01/04/2022     MCHC 32.6 01/04/2022     RDW 14.1 01/04/2022     .0 01/04/2022            Lab Results   Component Value Date targeting covid-19 and potential risks. Pt is agreeable to treatment plan as outlined above  - ID following, appreciate recs  4. Elevated D-dimer:  - heparin gtt  - LE dopplers negative   - will proceed with CTA now that creatinine is normal  5.  NAJMA: prere

## 2022-01-04 NOTE — PAYOR COMM NOTE
[id number]  ADMISSION REVIEW     Payor: [employer]ROW PPO  Subscriber #:  [id number]  Authorization Number: [id number]    Admit date: 1/3/22  Admit time:  3:06 AM       REVIEW DOCUMENTATION:     ED Pr for stated complaint: possible exposure to covid, fever 99.6, weakness, started before estephania. vern*  Other systems are as noted in HPI. Constitutional and vital signs reviewed. All other systems reviewed and negative except as noted above.     Phys Ciara (*)     Urobilinogen Urine 4.0 (*)     All other components within normal limits   LACTIC ACID, PLASMA - Abnormal; Notable for the following components:    Lactic Acid 3.0 (*)     All other components within normal limits   D-DIMER - Abnormal; Notabl was seen immediately upon her arrival into her room in a pod. She is noted to be hypotensive and febrile. Her Covid test came back positive. Her blood pressure rebounded nicely with a liter of fluid.   We will keep her on a maintenance rate but be judic ground-glass opacities with patchy consolidation scattered in the bilateral lungs, most pronounced in the mid to lower left lung are concerning for atypical pneumonia from COVID-19 infection, with other etiologies not  entirely excluded.   Clinical correlat diagnosis)  COVID-19     Disposition:  Admit  1/3/2022 12:24 am    Follow-up:  No follow-up provider specified.         Medications Prescribed:  Current Discharge Medication List                          Hospital Problems             Present on Admission  D Hx  Family History   Problem Relation Age of Onset   • Cancer Sister         colon, uterine   • Ovarian Cancer Sister    • Cancer Sister         uterine   • Ovarian Cancer Sister        Review of Systems  Comprehensive ROS reviewed and negative except for COMPARISON:  [facility] , XR, XR CHEST AP PORTABLE  (CPT=71045), 1/02/2022, 11:21 PM.  [place], CT, CT BRAIN OR HEAD (03926), 6/08/2021, 8:54 PM.  INDICATIONS:  possible exposure to covid, fever 99.6, weakness, started before estephania.  lives at home with m bilateral lungs, most pronounced in the mid to lower left lung are concerning for atypical pneumonia from COVID-19 infection, with other etiologies not entirely excluded. Clinical correlation recommended. Possible small left pleural effusion.   Elyssa Spore Certification of Need for Inpatient Hospitalization - Initial Certification    Patient will require inpatient services that will reasonably be expected to span two midnight's based on the clinical documentation in H+P.    Based on patients current state of Oxybutynin Chloride ER (DITROPAN-XL) 24 hr tab 15 mg     Date Action Dose Route User    1/4/2022 0902 Given 15 mg Oral Nicolás Bond RN    1/3/2022 1554 Given 15 mg Oral Magali Hernandez RN      pantoprazole (PROTONIX) EC tab 20 mg     Date Action Jd Jang % — — — CAROL    01/03/22 0800 99.5 °F (37.5 °C) 59 23 102/46 92 % — None (Room air) — CAROL    01/03/22 0700 — 67 21 95/45 91 % — — — CAROL    01/03/22 0600 — 63 27 100/57 95 % — — — EN    01/03/22 0500 — 62 30 108/48 95 % — — — EN    01/03/22 0400 98.7 °F (37.1 ° pneumonia. Mild hypoxemia on presentation. Ferritin 638. Partially vaccinated. 1 dose of Pfizer back in April 2021. With slight elevation D-dimer. Underlying history of hypertension. Initially hypotensive on presentation.   Probably about 10 days of

## 2022-01-04 NOTE — COVID NURSING ASSESSMENT
COVID-19 Daily Discharge Readiness-Nursing    O2 Sat at Rest:   92% on 12L HFNC   O2 Sat with Exertion:    84% on  5L, needed 12L HFNC to recover to 90's   Temperature max from last 24 hrs: Temp (24hrs), Av.4 °F (36.9 °C), Min:97.9 °F (36.6 °C), Max:98

## 2022-01-04 NOTE — PROGRESS NOTES
01/04/22 1706   Provider Notification   Reason for Communication Change in status   Test/Procedure Name Increasing O2 needs - on 12L HFNC   Provider Name Tutu Alberts MD   Method of Communication Page   Response Waiting for response   Notificatio

## 2022-01-04 NOTE — PHYSICAL THERAPY NOTE
PHYSICAL THERAPY EVALUATION - INPATIENT     Room Number: 515/515-A  Evaluation Date: 1/4/2022  Type of Evaluation: Initial  Physician Order: PT Eval and Treat    Presenting Problem: COVID pna  Co-Morbidities : htn  Reason for Therapy: Mobility Dysfun to/from Stand at assistance level: moderate assistance     Goal #3 Patient is able to ambulate 5 feet with assist device: walker - rolling at assistance level: moderate assistance     Goal #4    Goal #5    Goal #6    Goal Comments: Goals established on 1/4 92  At rest oxygen flow (liters per minute): 6  SPO2% Ambulation on Oxygen: 89  Ambulation oxygen flow (liters per minute): 12    NEUROLOGICAL FINDINGS  Neurological Findings: None                     AM-PAC '6-Clicks' INPATIENT SHORT FORM - BASIC MOBILITY of Session: Needs met;Call light within reach;RN aware of session/findings; All patient questions and concerns addressed; Alarm set; In bed; With [facility] staff    Therapist PPE: N95 and surgical Mask, gloves gown and goggles were worn.   Patient: none

## 2022-01-04 NOTE — PROGRESS NOTES
[facility] Hospitalist Progress Note     PCP: Katy Cote MD    Chief Complaint: follow-up    Overnight/Interim Events:      SUBJECTIVE:  Laying in bed, some confusion. Feels \"so-so\". No pain. On 5L. Using IS. Wet cough per staff.      OBJECTIVE:  Tem GLU 98 98 67*       Recent Labs   Lab 01/02/22  2256 01/02/22  2333 01/03/22  0445 01/04/22  0723   ALT 43  --  34 30   *  --  111* 92*   ALB 2.8*  --  2.2* 2.2*   LDH  --  317* 317* 331*       Recent Labs   Lab 01/04/22  0905 01/04/22  0941 01/04 -reviewed home meds, continue SSRI currently appears stable      # LUTS  -oxybutynin     Dispo: ICU to floor    Questions/concerns were discussed with patient by bedside.  D/w RN Kamaljit Durbin    Total Time spent with patient and coordinating care:  36 minutes

## 2022-01-04 NOTE — PAYOR COMM NOTE
--------------  ADMISSION REVIEW     Secondary Payor: TRANG OROZCO [facility]  Subscriber #:  [id number]  Authorization Number: [id number]    Admit date: 1/3/22  Admit time:  3:06 AM       Admitting Physician: Angie Sosa MD  Attending Physician:  Antonietta Mcdonald quittin.8      Smokeless tobacco: Never Used    Vaping Use      Vaping Use: Never used    Alcohol use: No      Alcohol/week: 0.0 standard drinks    Drug use:  No             Review of Systems    Positive for stated complaint: possible exposure to covid Albumin 2.8 (*)     Globulin  5.2 (*)     A/G Ratio 0.5 (*)     All other components within normal limits   URINALYSIS WITH CULTURE REFLEX - Abnormal; Notable for the following components:    Urine Color Ciara (*)     Urobilinogen Urine 4.0 (*)     All Final result                 Please view results for these tests on the individual orders.    LACTIC ACID 3 HR POST POSITIVE   PTT, ACTIVATED   BLOOD CULTURE   BLOOD CULTURE   CBC W/ DIFFERENTIAL          Patient was seen immediately upon her arrival into  Possible small left pleural effusion. Cardiomegaly with prominence of the central pulmonary vascularity. Calcified plaque in the thoracic aorta. No pneumothorax.             CONCLUSION:  Patchy interstitial and ground-glass opacities with patchy consolid drip ordered.         Admission disposition: 1/3/2022 12:24 AM                                  Disposition and Plan     Clinical Impression:  Hypotension, unspecified hypotension type  (primary encounter diagnosis)  COVID-19     Disposition:  Admit  1/3/20 (Adjusted), Q24H  [START ON 2022] remdesivir, 100 mg, Q24H        Social History    Tobacco Use      Smoking status: Former Smoker        Types: Cigarettes        Quit date: 3/1/1997        Years since quittin.8      Smokeless tobacco: Never Used 01/03/2022    TP 6.0 01/03/2022     01/03/2022    ALT 34 01/03/2022    .8 01/03/2022    INR 1.11 01/03/2022    PTP 14.3 01/03/2022    DDIMER 1.17 01/03/2022    CRP 5.04 01/03/2022     01/02/2022       Radiology: CT BRAIN OR HEAD (02801) possible exposure to covid, fever 99.6, weakness, started before estephania. lives at home with mother. PATIENT STATED HISTORY: (As transcribed by Technologist)  Patient offered no additional history at this time.     FINDINGS:  Patchy interstitial and grou # GERD  -PPI, wean as o/p if appropriate    # Major Depression/ Generalized anxiety d/o   -reviewed home meds, continue SSRI currently appears stable     # LUTS  -oxybutynin      Outpatient records or previous hospital records reviewed.  [facility] Intravenous Saman Esquivel RN      glucose (SPJ5) 15 GM/59ML oral liquid     Date Action Dose Route User    1/4/2022 0908 Given 15 g (none) Saman Esquivel RN      DULoxetine (CYMBALTA) DR particles cap 60 mg     Date Action Dose Route User    1/4/2022 cannula 4.5 L/min KF    01/03/22 2344 98.3 °F (36.8 °C) 61 18 136/55 90 % — Nasal cannula 5 L/min AW    01/03/22 1940 — — — 122/47 — — — — AW    01/03/22 1933 97.9 °F (36.6 °C) 57 16 — — — — — AW    01/03/22 1600 98.8 °F (37.1 °C) 58 20 128/56 97 % — None recorded.     Current Medications:   Current Facility-Administered Medications:   •  sodium chloride 0.9% IV bolus 500 mL, 500 mL, Intravenous, PRN  •  albuterol 108 (90 Base) MCG/ACT inhaler 4 puff, 4 puff, Inhalation, Q4H PRN  •  benzonatate (TESSALON) c deformity.                         FGVXZ: DBSRJJJ rate and rhythm, normal S1S2                          Abdomen: soft, non-tender, non-distended, positive BS.                         Extremity: No clubbing or cyanosis.  no edema                          S CTA to eval for PE given +D-dimer  - decadron started given new hypoxia  3.  COVID-19 infection  - Dx made: 1/2/21  - O2 as above  - follow ferritin / ldh / crp to evaluate inflammatory response  - d dimer mildly elevated, < 5x ULN, on empiric heparin gtt

## 2022-01-04 NOTE — PROGRESS NOTES
Shirley Douglas   COVID-19 Daily Discharge Readiness-Nursing    O2 Sat at Rest:     92%  On 3L  Temperature max from last 24 hrs: Temp (24hrs), Av.8 °F (37.1 °C), Min:97.9 °F (36.6 °C), Max:99.5 °F (37.5 °C)    Inflammatory Markers: Recent Labs   Lab 22  2333 0

## 2022-01-05 LAB
ALBUMIN SERPL-MCNC: 2.2 G/DL (ref 3.4–5)
ALBUMIN/GLOB SERPL: 0.6 {RATIO} (ref 1–2)
ALP LIVER SERPL-CCNC: 120 U/L
ALT SERPL-CCNC: 27 U/L
ANION GAP SERPL CALC-SCNC: 8 MMOL/L (ref 0–18)
APTT PPP: 144.6 SECONDS (ref 23.3–35.6)
AST SERPL-CCNC: 70 U/L (ref 15–37)
BASOPHILS # BLD AUTO: 0.01 X10(3) UL (ref 0–0.2)
BASOPHILS NFR BLD AUTO: 0.6 %
BILIRUB SERPL-MCNC: 0.5 MG/DL (ref 0.1–2)
BUN BLD-MCNC: 16 MG/DL (ref 7–18)
CALCIUM BLD-MCNC: 8.3 MG/DL (ref 8.5–10.1)
CHLORIDE SERPL-SCNC: 107 MMOL/L (ref 98–112)
CO2 SERPL-SCNC: 22 MMOL/L (ref 21–32)
CREAT BLD-MCNC: 0.83 MG/DL
CRP SERPL-MCNC: 5 MG/DL (ref ?–0.3)
D DIMER PPP FEU-MCNC: 0.96 UG/ML FEU (ref ?–0.82)
DEPRECATED HBV CORE AB SER IA-ACNC: 404.8 NG/ML
EOSINOPHIL # BLD AUTO: 0 X10(3) UL (ref 0–0.7)
EOSINOPHIL NFR BLD AUTO: 0 %
ERYTHROCYTE [DISTWIDTH] IN BLOOD BY AUTOMATED COUNT: 14.5 %
GLOBULIN PLAS-MCNC: 3.7 G/DL (ref 2.8–4.4)
GLUCOSE BLD-MCNC: 130 MG/DL (ref 70–99)
HCT VFR BLD AUTO: 39.3 %
HGB BLD-MCNC: 13 G/DL
IMM GRANULOCYTES # BLD AUTO: 0.02 X10(3) UL (ref 0–1)
IMM GRANULOCYTES NFR BLD: 1.1 %
LDH SERPL L TO P-CCNC: 306 U/L
LYMPHOCYTES # BLD AUTO: 0.94 X10(3) UL (ref 1–4)
LYMPHOCYTES NFR BLD AUTO: 52.5 %
MAGNESIUM SERPL-MCNC: 1.8 MG/DL (ref 1.6–2.6)
MCH RBC QN AUTO: 30.6 PG (ref 26–34)
MCHC RBC AUTO-ENTMCNC: 33.1 G/DL (ref 31–37)
MCV RBC AUTO: 92.5 FL
MONOCYTES # BLD AUTO: 0.23 X10(3) UL (ref 0.1–1)
MONOCYTES NFR BLD AUTO: 12.8 %
NEUTROPHILS # BLD AUTO: 0.59 X10 (3) UL (ref 1.5–7.7)
NEUTROPHILS # BLD AUTO: 0.59 X10(3) UL (ref 1.5–7.7)
NEUTROPHILS NFR BLD AUTO: 33 %
OSMOLALITY SERPL CALC.SUM OF ELEC: 287 MOSM/KG (ref 275–295)
PHOSPHATE SERPL-MCNC: 2.7 MG/DL (ref 2.5–4.9)
PLATELET # BLD AUTO: 158 10(3)UL (ref 150–450)
POTASSIUM SERPL-SCNC: 4.1 MMOL/L (ref 3.5–5.1)
PROT SERPL-MCNC: 5.9 G/DL (ref 6.4–8.2)
RBC # BLD AUTO: 4.25 X10(6)UL
SODIUM SERPL-SCNC: 137 MMOL/L (ref 136–145)
WBC # BLD AUTO: 1.8 X10(3) UL (ref 4–11)

## 2022-01-05 RX ORDER — HEPARIN SODIUM 5000 [USP'U]/ML
5000 INJECTION, SOLUTION INTRAVENOUS; SUBCUTANEOUS EVERY 8 HOURS SCHEDULED
Status: DISCONTINUED | OUTPATIENT
Start: 2022-01-05 | End: 2022-01-06

## 2022-01-05 RX ORDER — MAGNESIUM OXIDE 400 MG (241.3 MG MAGNESIUM) TABLET
400 TABLET ONCE
Status: COMPLETED | OUTPATIENT
Start: 2022-01-05 | End: 2022-01-05

## 2022-01-05 NOTE — OCCUPATIONAL THERAPY NOTE
OCCUPATIONAL THERAPY EVALUATION - INPATIENT     Room Number: 998/328-I  Evaluation Date: 1/5/2022  Type of Evaluation: Initial  Presenting Problem: Hypotension, Covid-19 1/2/22    Physician Order: IP Consult to Occupational Therapy  Reason for Therapy: ADL total    Functional Mobility:  Supine to Sit : Dependent assistance  Sit to Stand: Dependent assistance  Sit to supine: max a of 2 w/ patient resisting and c/o fear of falling  Chair transfer: Dependent, unable after multiple attempts d/t resisting w/ c/o Dominance: Right  Drives: No  Patient Regularly Uses: Glasses; Hearing aides (HA are at home)    Prior Level of Function: Per patient report, lives at home and is typically able to transfer without assist and is able to walk w/ use of cane in the home and a Eating meals?: A Little    AM-PAC Score:  Score: 10  Approx Degree of Impairment: 74.7%  Standardized Score (AM-PAC Scale): 27.31    Additional tests:                                  PLAN  OT Treatment Plan: Balance activities; Energy conservation/work sim

## 2022-01-05 NOTE — CONSULTS
[address] [place] [address]  TEL: [phone]  FAX: [phone]    Esme Mckeon Patient Status:  Inpatient    1940 MRN [id number]   [facility] 4SW-A Attending Lydia, [address] [place] [address] Day # 1 PCP Eva COMER 500 mg in sodium chloride 0.9% 250 mL IVPB, 500 mg, Intravenous, Q24H  •  remdesivir 100 mg in sodium chloride 0.9% 270 mL IVPB, 100 mg, Intravenous, Q24H  •  artificial saliva substitute (BIOTENE) solution, , Oral, PRN  •  benzocaine-menthol (CEPACOL (SUG 19 01/04/2022     01/04/2022    K 4.1 01/04/2022     01/04/2022    CO2 23.0 01/04/2022    GLU 67 01/04/2022    CA 8.0 01/04/2022    ALB 2.2 01/04/2022    ALKPHO 123 01/04/2022    BILT 0.5 01/04/2022    TP 6.1 01/04/2022    AST 92 01/04/2022 abdominal complaints. PLAN:  Continue remdesivir while here COVID follow-up LFTs. Add baricitinib with increased O2 demands. .  Currently on Zosyn  azithromycin. Due to elevated PCT. Continue Zosyn for now.   Finish rajendra

## 2022-01-05 NOTE — PLAN OF CARE
COVID-19 Daily Discharge Readiness-Nursing    O2 Sat at Rest:   91  % on 12L HF  O2 Sat with Exertion: SPO2% Ambulation on Oxygen: 89  % on Ambulation oxygen flow (liters per minute): 12  liters   Temperature max from last 24 hrs: Temp (24hrs), Av.8 °F evaluate response  - Implement non-pharmacological measures as appropriate and evaluate response  - Consider cultural and social influences on pain and pain management  - Manage/alleviate anxiety  - Utilize distraction and/or relaxation techniques  - Monit

## 2022-01-05 NOTE — COVID NURSING ASSESSMENT
COVID-19 Daily Discharge Readiness-Nursing    O2 Sat at Rest:     88-91% on 12L HFNC   O2 Sat with Exertion: SPO2% Ambulation on Oxygen: 89  % on Ambulation oxygen flow (liters per minute): 15  liters   Temperature max from last 24 hrs: Temp (24hrs), Av

## 2022-01-05 NOTE — CM/SW NOTE
01/05/22 1100   CM/SW Referral Data   Referral Source Social Work (self-referral)   Reason for Referral Discharge planning   Informant Patient   Patient Info   Patient's Current Mental Status at Time of Assessment Alert;Oriented   Patient's Home Environ

## 2022-01-05 NOTE — PROGRESS NOTES
[facility] Hospitalist Progress Note     PCP: Pat Mcfadden MD    Chief Complaint: follow-up    Overnight/Interim Events:      SUBJECTIVE:  Laying in bed, some confusion but answering orienting questions. O2 requirement up to 11L.  Oriented to [facility], thought BUN 29* 26* 19* 16   CREATSERUM 1.97* 1.44* 0.89 0.83   CA 9.4 7.9* 8.0* 8.3*   MG  --   --  1.6 1.8   PHOS  --   --  2.4* 2.7   GLU 98 98 67* 130*       Recent Labs   Lab 01/02/22  2256 01/02/22  2333 01/03/22  0445 01/04/22  0723 01/05/22  0719   ALT 4 now on zosyn  - CTH neg      # Acute renal failure-->resolved   - pre-renal 2/2 hypoTN, improving c IVF     # Elevated d-dimer  - empiric hep gtt --> proph  - LE doppler neg PE   - cta neg     # Encephalopathy  - acute illness may be contributing but I ass

## 2022-01-05 NOTE — PAYOR COMM NOTE
--------------  CONTINUED STAY REVIEW    Payor: [employer] M[employer]ROW PPO  Subscriber #:  [id number]  Authorization Number: [id number]    Admit date: 1/3/22  Admit time:  3:06 AM    Admitting Physician: Vanessa Maria MD  Attending Physician:  Guerita Carroll Bag 3.375 g Intravenous An Phan RN      remdesivir 100 mg in sodium chloride 0.9% 270 mL IVPB     Date Action Dose Route User    1/5/2022 0941 New Bag 100 mg Intravenous Saman Esquivel RN          Vitals (last day)     Date/Time Temp Pulse Resp BP questions. O2 requirement up to 11L.  Oriented to [facility], thought today was Jeovanny      OBJECTIVE:  Temp:  [97.4 °F (36.3 °C)-98.3 °F (36.8 °C)] 97.5 °F (36.4 °C)  Pulse:  [59-70] 59  Resp:  [18-31] 18  BP: (119-149)/(60-88) 139/60     Intake/Output:     Int Lab 01/02/22  2256 01/02/22  2333 01/03/22  0445 01/04/22  0723 01/05/22  0719   ALT 43  --  34 30 27   *  --  111* 92* 70*   ALB 2.8*  --  2.2* 2.2* 2.2*   LDH  --  317* 317* 331* 306*                Recent Labs   Lab 01/04/22  0905 01/04/22  094 --> proph  - LE doppler neg PE   - cta neg     # Encephalopathy  - acute illness may be contributing but I assume baseline may be similar as well      # Hypothyroidism  -cont home levothyroxine      # GERD  -PPI, wean as o/p if appropriate        # LUTS  -

## 2022-01-06 ENCOUNTER — APPOINTMENT (OUTPATIENT)
Dept: CV DIAGNOSTICS | Facility: HOSPITAL | Age: 82
DRG: 871 | End: 2022-01-06
Attending: INTERNAL MEDICINE
Payer: MEDICARE

## 2022-01-06 LAB
ALBUMIN SERPL-MCNC: 2.2 G/DL (ref 3.4–5)
ALBUMIN/GLOB SERPL: 0.6 {RATIO} (ref 1–2)
ALP LIVER SERPL-CCNC: 129 U/L
ALT SERPL-CCNC: 27 U/L
ANION GAP SERPL CALC-SCNC: 6 MMOL/L (ref 0–18)
AST SERPL-CCNC: 61 U/L (ref 15–37)
ATRIAL RATE: 101 BPM
BILIRUB SERPL-MCNC: 0.5 MG/DL (ref 0.1–2)
BUN BLD-MCNC: 20 MG/DL (ref 7–18)
CALCIUM BLD-MCNC: 8.4 MG/DL (ref 8.5–10.1)
CHLORIDE SERPL-SCNC: 108 MMOL/L (ref 98–112)
CO2 SERPL-SCNC: 24 MMOL/L (ref 21–32)
CREAT BLD-MCNC: 0.84 MG/DL
D DIMER PPP FEU-MCNC: 0.86 UG/ML FEU (ref ?–0.82)
GLOBULIN PLAS-MCNC: 3.9 G/DL (ref 2.8–4.4)
GLUCOSE BLD-MCNC: 130 MG/DL (ref 70–99)
MAGNESIUM SERPL-MCNC: 2 MG/DL (ref 1.6–2.6)
OSMOLALITY SERPL CALC.SUM OF ELEC: 290 MOSM/KG (ref 275–295)
PHOSPHATE SERPL-MCNC: 2.7 MG/DL (ref 2.5–4.9)
POTASSIUM SERPL-SCNC: 4 MMOL/L (ref 3.5–5.1)
PROT SERPL-MCNC: 6.1 G/DL (ref 6.4–8.2)
Q-T INTERVAL: 362 MS
QRS DURATION: 90 MS
QTC CALCULATION (BEZET): 487 MS
R AXIS: -33 DEGREES
SODIUM SERPL-SCNC: 138 MMOL/L (ref 136–145)
T AXIS: -5 DEGREES
VENTRICULAR RATE: 109 BPM

## 2022-01-06 PROCEDURE — 93306 TTE W/DOPPLER COMPLETE: CPT | Performed by: INTERNAL MEDICINE

## 2022-01-06 RX ORDER — AMIODARONE HYDROCHLORIDE 200 MG/1
400 TABLET ORAL 2 TIMES DAILY WITH MEALS
Status: DISCONTINUED | OUTPATIENT
Start: 2022-01-06 | End: 2022-01-09

## 2022-01-06 RX ORDER — ENOXAPARIN SODIUM 100 MG/ML
1 INJECTION SUBCUTANEOUS EVERY 24 HOURS
Status: DISCONTINUED | OUTPATIENT
Start: 2022-01-06 | End: 2022-01-06

## 2022-01-06 RX ORDER — DILTIAZEM HYDROCHLORIDE 5 MG/ML
10 INJECTION INTRAVENOUS ONCE
Status: DISCONTINUED | OUTPATIENT
Start: 2022-01-06 | End: 2022-01-11

## 2022-01-06 NOTE — PROGRESS NOTES
27 [facility] Patient Status:  Inpatient    1940 MRN [id number]   [facility] 5NW-A Attending Anny Latham MD   Hosp Day # 3 PCP Sumaya Caldera MD     SUBJECTIVE:  Pt denies complaints, oxygen requirements are (DITROPAN-XL) 24 hr tab 15 mg, 15 mg, Oral, Daily  •  Piperacillin Sod-Tazobactam So (ZOSYN) 3.375 g in dextrose 5% 100 mL IVPB-MBP, 3.375 g, Intravenous, Q8H      Physical Exam:                          General: alert, cooperative, in NAD                  interpretation except where noted. ASSESSMENT/PLAN:  1. Sepsis:   - IVF  - monitor cultures  - procalcitonin elevated - on empiric zosyn (1/3 -   ), s/p 3 days azithro  - lactate now normalized  2.  Acute hypoxemic respiratory failure: 2/2 covid PNA

## 2022-01-06 NOTE — CONSULTS
[facility] Cardiology Consultation    Hermanroxane Alford Patient Status:  Inpatient    1940 MRN [id number]   [facility] 5NW-A Attending Leigh Ann Dawkins MD   Hosp Day # 3 PCP Louis Reeves MD     Reason for Consultation:  PAF      History of Systems:  All systems were reviewed and are negative except as described above in HPI.     Physical Exam:      Temp:  [97.5 °F (36.4 °C)-98.1 °F (36.7 °C)] 98.1 °F (36.7 °C)  Pulse:  [59-93] 93  Resp:  [18-20] 18  BP: ()/(56-92) 117/92    Last 3 Weigh for now. Revisit a/c on dc pending hospital course.       Brandon Marrufo MD  1/6/2022  9:31 AM

## 2022-01-06 NOTE — PROGRESS NOTES
01/06/22 0151   Provider Notification   Reason for Communication New consult   Provider Name Other (comment)  (Dr. Kim Cartwright)   Method of Communication Page   Response Waiting for response   Notification Time 0151   New consult for new onset of afib

## 2022-01-06 NOTE — PHYSICAL THERAPY NOTE
PHYSICAL THERAPY TREATMENT NOTE - INPATIENT    Room Number: 484/085-V     Session: 1     Number of Visits to Meet Established Goals: 6    Presenting Problem: COVID pna  Co-Morbidities : htn  ASSESSMENT     Pt continues to present with impaired strength TOLERANCE                         O2 WALK         AM-PAC '6-Clicks' INPATIENT SHORT FORM - BASIC MOBILITY  How much difficulty does the patient currently have. ..   Patient Difficulty: Turning over in bed (including adjusting bedclothes, sheets and blankets) addressed; Alarm set    Therapist PPE: Mask, gloves gown and goggles were worn.   Patient/Family PPE: Mask 50% of session

## 2022-01-06 NOTE — PLAN OF CARE
COVID-19 Daily Discharge Readiness-Nursing    O2 Sat at Rest:     %   O2 Sat with Exertion: SPO2% Ambulation on Oxygen: 89  % on Ambulation oxygen flow (liters per minute): 12  liters   Temperature max from last 24 hrs: Temp (24hrs), Av.8 °F (36.6 °C), cards consult  - See additional Care Plan goals for specific interventions  Outcome: Progressing     Problem: PAIN - ADULT  Goal: Verbalizes/displays adequate comfort level or patient's stated pain goal  Description: INTERVENTIONS:  - Encourage pt to monit

## 2022-01-06 NOTE — PROGRESS NOTES
01/06/22 0134   Provider Notification   Reason for Communication Evaluate; Review case   Test/Procedure Name EKG  (AFIB RVR)   Provider Name Other (comment)  (Dr. Sonny Finn)   Method of Communication Page   Response Waiting for response   Notification Time 167

## 2022-01-06 NOTE — PLAN OF CARE
COVID-19 Daily Discharge Readiness-Nursing    O2 Sat at Rest:   91% on 10L HF  Temperature max from last 24 hrs: Temp (24hrs), Av.7 °F (36.5 °C), Min:97.4 °F (36.3 °C), Max:98.1 °F (36.7 °C)    Inflammatory Markers: Recent Labs   Lab 22  0723  Progressing  1/6/2022 0243 by Jared Flores RN  Outcome: Progressing     Problem: PAIN - ADULT  Goal: Verbalizes/displays adequate comfort level or patient's stated pain goal  Description: INTERVENTIONS:  - Encourage pt to monitor pain and request carine [phone] by Felton Frederick RN  Outcome: Progressing

## 2022-01-06 NOTE — PROGRESS NOTES
[facility] Hospitalist Progress Note     PCP: eBbeto Camara MD    Chief Complaint: follow-up      SUBJECTIVE:  Laying in bed. On 11L NC.        OBJECTIVE:  Temp:  [97.5 °F (36.4 °C)-98.1 °F (36.7 °C)] 98.1 °F (36.7 °C)  Pulse:  [59-93] 93  Resp:  [18-20] ALT 43  --  34 30 27 27   *  --  111* 92* 70* 61*   ALB 2.8*  --  2.2* 2.2* 2.2* 2.2*   LDH  --  317* 317* 331* 306*  --      Recent Labs   Lab 01/04/22  0905 01/04/22  0941 01/04/22  1034 01/04/22  1129   PGLU 66* 92 99 89         Meds:     • dil but I assume baseline may be similar as well     # Hypothyroidism  -cont home levothyroxine      # GERD  -PPI, wean as o/p if appropriate     # Major Depression/ Generalized anxiety d/o   -reviewed home meds, continue SSRI currently appears stable      # L

## 2022-01-07 LAB
ALBUMIN SERPL-MCNC: 2.1 G/DL (ref 3.4–5)
ALBUMIN/GLOB SERPL: 0.5 {RATIO} (ref 1–2)
ALP LIVER SERPL-CCNC: 128 U/L
ALT SERPL-CCNC: 30 U/L
ANION GAP SERPL CALC-SCNC: 6 MMOL/L (ref 0–18)
AST SERPL-CCNC: 71 U/L (ref 15–37)
BILIRUB SERPL-MCNC: 0.7 MG/DL (ref 0.1–2)
BUN BLD-MCNC: 25 MG/DL (ref 7–18)
CALCIUM BLD-MCNC: 8.5 MG/DL (ref 8.5–10.1)
CHLORIDE SERPL-SCNC: 110 MMOL/L (ref 98–112)
CO2 SERPL-SCNC: 21 MMOL/L (ref 21–32)
CREAT BLD-MCNC: 0.99 MG/DL
GLOBULIN PLAS-MCNC: 4.4 G/DL (ref 2.8–4.4)
GLUCOSE BLD-MCNC: 108 MG/DL (ref 70–99)
M TB IFN-G CD4+ T-CELLS BLD-ACNC: 0 IU/ML
M TB TUBERC IFN-G BLD QL: NEGATIVE
M TB TUBERC IGNF/MITOGEN IGNF CONTROL: 9.4 IU/ML
OSMOLALITY SERPL CALC.SUM OF ELEC: 289 MOSM/KG (ref 275–295)
POTASSIUM SERPL-SCNC: 5 MMOL/L (ref 3.5–5.1)
PROT SERPL-MCNC: 6.5 G/DL (ref 6.4–8.2)
QFT TB1 AG MINUS NIL: 0 IU/ML
QFT TB2 AG MINUS NIL: 0 IU/ML
SODIUM SERPL-SCNC: 137 MMOL/L (ref 136–145)

## 2022-01-07 RX ORDER — MULTIPLE VITAMINS W/ MINERALS TAB 9MG-400MCG
1 TAB ORAL DAILY
Status: DISCONTINUED | OUTPATIENT
Start: 2022-01-07 | End: 2022-01-11

## 2022-01-07 NOTE — CONSULTS
[address] [facility]  TEL: [phone]  FAX: [phone]    Marybel Mean Patient Status:  Inpatient    1940 MRN [id number]   [facility] 4SW-A Attending Lydia, [address] [place] [address] Day # 4 PCP Eva COMER (TESSALON) cap 200 mg, 200 mg, Oral, TID PRN  •  acetaminophen (TYLENOL) tab 650 mg, 650 mg, Oral, Q6H PRN  •  ondansetron (ZOFRAN) injection 4 mg, 4 mg, Intravenous, Q6H PRN  •  artificial saliva substitute (BIOTENE) solution, , Oral, PRN  •  benzocaine-m 0.7 01/07/2022    TP 6.5 01/07/2022    AST 71 01/07/2022    ALT 30 01/07/2022       Microbiologic Data:     Reviewed in EMR    Imaging:  Ultrasound    Imaging results reviewed     Impression:  Patient Active Problem List:     Hypothyroidism     Spinal sten

## 2022-01-07 NOTE — PROGRESS NOTES
[facility] Hospitalist Progress Note     PCP: Inderjit Franks MD    Chief Complaint: follow-up      SUBJECTIVE:  Laying in bed.  O2 down to 3L      OBJECTIVE:  Temp:  [97 °F (36.1 °C)-98.1 °F (36.7 °C)] 97.7 °F (36.5 °C)  Pulse:  [68-94] 75  Resp:  [16-20] apixaban  5 mg Oral BID   • baricitinib  4 mg Oral Daily   • dexamethasone Sodium Phosphate  6 mg Intravenous Q24H   • guaiFENesin ER  600 mg Oral BID   • DULoxetine  60 mg Oral Daily   • levothyroxine  100 mcg Oral Daily   • pantoprazole  20 mg Oral QAM A cards    Prophy: oriana    Dispo: med/pulm  -lives c dtr/briana  -PT recommending RONIT on discharge    Fletcher Arriola MD  [facility]  Internal Medicine, Hospitalist  Answering service: [phone]

## 2022-01-07 NOTE — PROGRESS NOTES
Jadiel [address] [place] [address] Cardiology Progress Note        Terry Passbilly Patient Status:  Inpatient    1940 MRN [id number]   [facility] 5NW-A Attending Kinjal Cabrera MD   Hosp Day # 4 PCP Jacob Jules MD     Subjective: 13.0   .0 125.0* 157.0 158. 0       Chem:  Recent Labs   Lab 01/02/22  2256 01/03/22  0445 01/04/22  0723 01/05/22  0719 01/06/22  0803   * 130* 135* 137 138   K 4.4 4.2 4.1 4.1 4.0   CL 93* 101 104 107 108   CO2 25.0 22.0 23.0 22.0 24.0   BUN

## 2022-01-07 NOTE — PLAN OF CARE
COVID-19 Daily Discharge Readiness-Nursing    O2 Sat at Rest:  SPO2% on Room Air at Rest: 90  %   O2 Sat with Exertion: SPO2% Ambulation on Oxygen: 89  % on Ambulation oxygen flow (liters per minute): 0  liters   Temperature max from last 24 hrs: Temp (2 HR  1/6: AM to wean O2 and work with PT   1/7 noc: wean O2  1/7AM: feel better and sit in the chair   Interventions:   - IV steroids, o2, metoprolol, cards consult  - See additional Care Plan goals for specific interventions  Outcome: Progressing     Probl

## 2022-01-07 NOTE — PLAN OF CARE
COVID-19 Daily Discharge Readiness-Nursing    O2 Sat at Rest:     91% on 3.5L   O2 Sat with Exertion: SPO2% Ambulation on Oxygen: 89  % on Ambulation oxygen flow (liters per minute): 12  liters   Temperature max from last 24 hrs: Temp (24hrs), Av.8 °F during anticipated neutropenic period  Description: INTERVENTIONS  - Monitor WBC  - Administer growth factors as ordered  - Implement neutropenic guidelines  Outcome: Progressing     Problem: SAFETY ADULT - FALL  Goal: Free from fall injury  Description: I

## 2022-01-07 NOTE — PHYSICAL THERAPY NOTE
PHYSICAL THERAPY TREATMENT NOTE - INPATIENT    Room Number: 864/565-D     Session: 2     Number of Visits to Meet Established Goals: 6    Presenting Problem: COVID pna  Co-Morbidities : htn  ASSESSMENT     Pt seen this PM for bed mobility, sit<>stand tr promotion    BALANCE                                                                                                                       Static Sitting: Fair  Dynamic Sitting: Fair -           Static Standing: Poor +  Dynamic Standing: Poor +    ACTIVITY Session: Up in chair;Needs met;Call light within reach;RN aware of session/findings; All patient questions and concerns addressed; Alarm set    Therapist PPE: N95 Mask, gloves, hairnet, gown and goggles were worn.   Patient/Family PPE: none

## 2022-01-07 NOTE — DIETARY NOTE
[facility] admitted on 1/2 presents with hypotension. PMH: Disorder of bone and cartilage, Essential hypertension, HYPOTHYROIDISM, and OSTEOARTHRITIS.      Admitting diagnosis:  Hypotension, unspecified hypote

## 2022-01-07 NOTE — CM/SW NOTE
Spoke w/daughter, she is still unsure if she wants RONIT vs. HH. [facility] the only accepting agency. Not many RONIT options due to COVID. Daughter stated she will make a decision closer to dc. Will follow up. Daughter expressed interest in [facility].  Will make

## 2022-01-08 LAB
ALBUMIN SERPL-MCNC: 2.3 G/DL (ref 3.4–5)
ALBUMIN/GLOB SERPL: 0.6 {RATIO} (ref 1–2)
ALP LIVER SERPL-CCNC: 123 U/L
ALT SERPL-CCNC: 35 U/L
ANION GAP SERPL CALC-SCNC: 6 MMOL/L (ref 0–18)
AST SERPL-CCNC: 67 U/L (ref 15–37)
BILIRUB SERPL-MCNC: 0.6 MG/DL (ref 0.1–2)
BUN BLD-MCNC: 33 MG/DL (ref 7–18)
CALCIUM BLD-MCNC: 8.5 MG/DL (ref 8.5–10.1)
CHLORIDE SERPL-SCNC: 110 MMOL/L (ref 98–112)
CO2 SERPL-SCNC: 23 MMOL/L (ref 21–32)
CREAT BLD-MCNC: 1 MG/DL
GLOBULIN PLAS-MCNC: 3.8 G/DL (ref 2.8–4.4)
GLUCOSE BLD-MCNC: 120 MG/DL (ref 70–99)
OSMOLALITY SERPL CALC.SUM OF ELEC: 296 MOSM/KG (ref 275–295)
POTASSIUM SERPL-SCNC: 4.4 MMOL/L (ref 3.5–5.1)
PROT SERPL-MCNC: 6.1 G/DL (ref 6.4–8.2)
SODIUM SERPL-SCNC: 139 MMOL/L (ref 136–145)

## 2022-01-08 RX ORDER — AMIODARONE HYDROCHLORIDE 200 MG/1
200 TABLET ORAL 2 TIMES DAILY
Qty: 60 TABLET | Refills: 0 | Status: SHIPPED | OUTPATIENT
Start: 2022-01-08 | End: 2022-01-10

## 2022-01-08 NOTE — PLAN OF CARE
COVID-19 Daily Discharge Readiness-Nursing    O2 Sat at Rest:  92% on RA  O2 Sat with Exertion: SPO2% Ambulation on Oxygen: 89  % on Ambulation oxygen flow (liters per minute): 0  liters   Temperature max from last 24 hrs: Temp (24hrs), Av.7 °F (36.5 ° metoprolol, cards consult  - See additional Care Plan goals for specific interventions  Outcome: Progressing     Problem: PAIN - ADULT  Goal: Verbalizes/displays adequate comfort level or patient's stated pain goal  Description: INTERVENTIONS:  - Encourage

## 2022-01-08 NOTE — PROGRESS NOTES
[facility] Cardiology Progress Note        Jenise Gosselin Patient Status:  Inpatient    1940 MRN [id number]   [facility] 5NW-A Attending Pamela Magallanes MD   Hosp Day # 5 PCP Jonathon Lambert MD     Subjective: 137 138 137 139   K 4.1 4.1 4.0 5.0 4.4    107 108 110 110   CO2 23.0 22.0 24.0 21.0 23.0   BUN 19* 16 20* 25* 33*   CREATSERUM 0.89 0.83 0.84 0.99 1.00   CA 8.0* 8.3* 8.4* 8.5 8.5   MG 1.6 1.8 2.0  --   --    PHOS 2.4* 2.7 2.7  --   --    GLU 67* 13

## 2022-01-08 NOTE — CONSULTS
[address] [place] [address]  TEL: [phone]  FAX: [phone]    Kristel Nirmala Patient Status:  Inpatient    1940 MRN [id number]   [facility] 4SW-A Attending Lydia, [address] [place] [address] Day # 4 PCP Eva COMER cap 200 mg, 200 mg, Oral, TID PRN  •  acetaminophen (TYLENOL) tab 650 mg, 650 mg, Oral, Q6H PRN  •  ondansetron (ZOFRAN) injection 4 mg, 4 mg, Intravenous, Q6H PRN  •  artificial saliva substitute (BIOTENE) solution, , Oral, PRN  •  benzocaine-menthol (CEP 0.7 01/07/2022    TP 6.5 01/07/2022    AST 71 01/07/2022    ALT 30 01/07/2022       Microbiologic Data:     Reviewed in EMR    Imaging:  Ultrasound    Imaging results reviewed     Impression:  Patient Active Problem List:     Hypothyroidism     Spinal sten

## 2022-01-08 NOTE — PROGRESS NOTES
[facility] Pulmonary, Critical Care and Sleep    Agnes Ybarra Patient Status:  Inpatient    1940 MRN [id number]   [facility] 5NW-A Attending Maurilio Kearns MD   Hosp Day # 4 PCP Rosangela Campos MD       Date of Admission: 2022 01/05/22  0719   WBC 4.1 4.0 1.8*   HGB 12.7 13.1 13.0   HCT 38.1 40.2 39.3   .0* 157.0 158.0     Recent Labs   Lab 01/05/22  0719 01/06/22  0803 01/07/22  0755   * 130* 108*   BUN 16 20* 25*   CREATSERUM 0.83 0.84 0.99   GFRAA 76 75 61   GFR appreciate recs  - procalcitonin elevated - on empiric zosyn (1/3 -   ), s/p 3 days azithro  Poosibly deescalate per ID  4. Elevated D-dimer:  - LE dopplers negative   - CTA negative for PE  5. NAJMA: prerenal  - improved  6. Proph:   - eliquis  7.  Dispo:  -

## 2022-01-08 NOTE — PROGRESS NOTES
27 [facility] Patient Status:  Inpatient    1940 MRN [id number]   [facility] 5NW-A Attending Tonya Campos MD   Hosp Day # 5 PCP Sandeep Wan MD     Pulm / Critical Care Progress Note     S: denies dyspnea. 01/05/22  0719   WBC 4.1 4.0 1.8*   HGB 12.7 13.1 13.0   HCT 38.1 40.2 39.3   .0* 157.0 158.0     Recent Labs   Lab 01/03/22  0445   INR 1.11         Recent Labs   Lab 01/05/22  0719 01/06/22  0803 01/07/22  0755    138 137   K 4.1 4.0 5.0   C risks. Pt is agreeable to treatment plan as outlined above  - ID following, appreciate recs  - taken off abx. 4. Elevated D-dimer:  - LE dopplers negative   - CTA negative for PE  - started on eliquis here for a fib. 5. NAJMA: prerenal  - improved  6.  Pr

## 2022-01-08 NOTE — PROGRESS NOTES
[facility] Hospitalist Progress Note     PCP: Katy Cote MD    Chief Complaint: follow-up      SUBJECTIVE:  Laying in bed. Off O2.      OBJECTIVE:  Temp:  [97.4 °F (36.3 °C)-97.9 °F (36.6 °C)] 97.9 °F (36.6 °C)  Pulse:  [56-90] 58  Resp:  [16-17] 17  B amiodarone  400 mg Oral BID with meals   • apixaban  5 mg Oral BID   • guaiFENesin ER  600 mg Oral BID   • DULoxetine  60 mg Oral Daily   • levothyroxine  100 mcg Oral Daily   • [facility]oprazole  [facility]   • Oxybutynin Chloride ER  15 mg Oral Daily planning. Needs RONIT. Can barely move.  Fall risk very high.   -lives c dtr/briana  -PT recommending RONIT on discharge    Kaykay Chatman MD  [facility]  Internal Medicine, Hospitalist  Answering service: [phone]

## 2022-01-09 RX ORDER — AMLODIPINE BESYLATE 5 MG/1
5 TABLET ORAL DAILY
Qty: 30 TABLET | Refills: 1 | Status: SHIPPED | OUTPATIENT
Start: 2022-01-09

## 2022-01-09 RX ORDER — AMLODIPINE BESYLATE 5 MG/1
5 TABLET ORAL DAILY
Status: DISCONTINUED | OUTPATIENT
Start: 2022-01-09 | End: 2022-01-11

## 2022-01-09 RX ORDER — AMIODARONE HYDROCHLORIDE 200 MG/1
200 TABLET ORAL 2 TIMES DAILY WITH MEALS
Status: DISCONTINUED | OUTPATIENT
Start: 2022-01-09 | End: 2022-01-10

## 2022-01-09 NOTE — PROGRESS NOTES
Multidisciplinary Discharge Rounds held 1/9/2022. Treatment team members present today include , , Charge Nurse, Nurse, RT, PT and Pharmacy caring for [employer].      Other care providers present:    Mobility Goal:    Readm

## 2022-01-09 NOTE — PROGRESS NOTES
[facility] Cardiology Progress Note        Marilyn Naylor Patient Status:  Inpatient    1940 MRN [id number]   [facility] 5NW-A Attending Jolene Murdock MD   Hosp Day # 6 PCP Inderjit Franks MD     Subjective: Systolic pressure was within the normal range,      estimated to be 28mm Hg.      Impressions:  This study is compared with previous dated 12/17/2012:   *       Labs:  HEM:  Recent Labs   Lab 01/02/22  2256 01/03/22  0445 01/04/22  0723 01/05/22  0719   WBC RRR  Resp:  CTA B  Abd:  Soft. ND, NT.  +BS, NA  Ext:  No edema    Plan:  1. Norvasc BP control  2. Amio to maintain SR  3. Eliquis  4.   Discharge planning      Columba Avalos MD

## 2022-01-09 NOTE — PROGRESS NOTES
[facility] Hospitalist Progress Note     PCP: Louis Reeves MD    Chief Complaint: follow-up      SUBJECTIVE:  Laying in bed. Slept ok. Deciding on whether or not to go to [facility]. She is a max assist. Needs RONIT. I d/w patient.      OBJECTIVE:  Temp:  [id number] amiodarone  200 mg Oral BID with meals   • nystatin  5 mL Oral QID   • multivitamin with minerals  1 tablet Oral Daily   • dilTIAZem HCl  10 mg Intravenous Once   • apixaban  5 mg Oral BID   • guaiFENesin ER  600 mg Oral BID   • DULoxetine  60 mg Oral Blue recs   - echo reviewed   - amio, lopressor, eliquis per cards  # oral thrush  - nystatin PO    Prophy: oriana    Dispo: dc planning. Needs RONIT.  Fall risk very high.   -lives c dtr/grandson  -PT recommending RONIT on discharge    Katrina Butts MD  [facility]

## 2022-01-09 NOTE — PLAN OF CARE
Problem: Patient/Family Goals  Goal: Patient/Family Long Term Goal  Description: Patient's Long Term Goal: discharge to [facility]    Interventions:  - ID/Pulm/Hospitalist, monitoring, labs, chest xray, CTA chest, o2, IV steroids, Remdesivir  - See additional C physical needs  - Identify cognitive and physical deficits and behaviors that affect risk of falls.   - [place] fall precautions as indicated by assessment.  - Educate pt/family on patient safety including physical limitations  - Instruct pt to call for a

## 2022-01-09 NOTE — PLAN OF CARE
COVID-19 Daily Discharge Readiness-Nursing    O2 Sat at Rest:  SPO2% on Room Air at Rest: 90  %   O2 Sat with Exertion: SPO2% Ambulation on Oxygen: 89  % on Ambulation oxygen flow (liters per minute): 0  liters   Temperature max from last 24 hrs: Temp (24h (TCC)     [] Pulmonologist     [x] Primary Care Physician     [] Other Specialty    Watched the home discharge recovery videos related to diagnosis. .. [x] Pneumonia     [] COPD    [] Home Health set up.     [x] Care partner identified and updated with t fever/infection during anticipated neutropenic period  Description: INTERVENTIONS  - Monitor WBC  - Administer growth factors as ordered  - Implement neutropenic guidelines  Outcome: Progressing     Problem: SAFETY ADULT - FALL  Goal: Free from fall injury

## 2022-01-09 NOTE — PLAN OF CARE
COVID-19 Daily Discharge Readiness-Nursing    O2 Sat at Rest:  93%  O2 Sat with Exertion: 90% on room air   Temperature max from last 24 hrs: Temp (24hrs), Av.6 °F (36.4 °C), Min:97.4 °F (36.3 °C), Max:97.9 °F (36.6 °C)    Inflammatory Markers:   Recen metoprolol, cards consult  - See additional Care Plan goals for specific interventions  Outcome: Progressing     Problem: PAIN - ADULT  Goal: Verbalizes/displays adequate comfort level or patient's stated pain goal  Description: INTERVENTIONS:  - Encourage

## 2022-01-09 NOTE — PROGRESS NOTES
ASSUMED PATIENT CARE AT 0000. A&OX3, FORGETFUL, HARD OF HEARING. SATURATING ABOVE 90% ON ROOM AIR. SHE NEEDED A LOT OF ENCOURAGEMENT TO DRINK. SHE DRANK ABOUT 200CC, HAD ONLY  50CC OF URINE IN THE PUREWICK CANISTER BUT HER DIAPER WAS WET.  Mireya Macedo

## 2022-01-10 RX ORDER — AMIODARONE HYDROCHLORIDE 200 MG/1
200 TABLET ORAL DAILY
Status: DISCONTINUED | OUTPATIENT
Start: 2022-01-11 | End: 2022-01-11

## 2022-01-10 RX ORDER — AMIODARONE HYDROCHLORIDE 200 MG/1
200 TABLET ORAL DAILY
Qty: 30 TABLET | Refills: 5 | Status: SHIPPED | OUTPATIENT
Start: 2022-01-10 | End: 2023-01-10

## 2022-01-10 NOTE — CM/SW NOTE
SW spoke w/pt's daughter Amanda Butler in regards to [employer] plan. Vicki asked SW to [employer]. Luisa Settler stating I'm not  Informed her the RN stated the pt is ready to be discharged and asked if she made a decision in regards to [facility] vs. [facility].  Daughter stating [facility] i

## 2022-01-10 NOTE — PROGRESS NOTES
COVID-19 Daily Discharge Readiness-Nursing    O2 Sat at rest:  90% on room air    Temperature max from last 24 hrs: Temp (24hrs), Av.8 °F (36.6 °C), Min:97.4 °F (36.3 °C), Max:98 °F (36.7 °C)    Inflammatory Markers: Recent Labs   Lab 22  0723 01

## 2022-01-10 NOTE — PROGRESS NOTES
[facility] Hospitalist note    PCP: Jonathon Lambert MD    Chief Complaint:  covid    SUBJECTIVE:  Remains on RA, answers basic questions. States she has urinated, not sure about #2. Has been eating.      OBJECTIVE:  Temp:  [97.4 °F (36.3 °C)-98 °F (36.7 °C in the last 168 hours.       Meds:     • [START ON 1/11/2022] amiodarone  200 mg Oral Daily   • amLODIPine  5 mg Oral Daily   • nystatin  5 mL Oral QID   • multivitamin with minerals  1 tablet Oral Daily   • dilTIAZem HCl  10 mg Intravenous Once   • apixaba

## 2022-01-10 NOTE — COVID NURSING ASSESSMENT
COVID-19 Daily Discharge Readiness-Nursing    O2 Sat at Rest:  SPO2% on Room Air at Rest: 90  %   O2 Sat with Exertion: SPO2% Ambulation on Oxygen: 89  % on Ambulation oxygen flow (liters per minute): 0  liters    Temperature max from last 24 hrs: Temp (24

## 2022-01-10 NOTE — PLAN OF CARE
Problem: Patient/Family Goals  Goal: Patient/Family Long Term Goal  Description: Patient's Long Term Goal: discharge to [facility]    Interventions:  - ID/Pulm/Hospitalist, monitoring, labs, chest xray, CTA chest, o2, IV steroids, Remdesivir  - See additional C SAFETY ADULT - FALL  Goal: Free from fall injury  Description: INTERVENTIONS:  - Assess pt frequently for physical needs  - Identify cognitive and physical deficits and behaviors that affect risk of falls.   - [place] fall precautions as indicated by asse

## 2022-01-10 NOTE — CM/SW NOTE
Writer spoke with dtr César Peres to discuss dc planning. dtr voiced frustration surrounding the marvin contacting her as she does not want to consider that rehab facility.  sw explained alternate accepting facility options of [facility] pending bed availa

## 2022-01-10 NOTE — CM/SW NOTE
Received a call from [facility]. Patient appealed DC. Case # is [id number]. Request for records, DND and IM sent to [facility].

## 2022-01-10 NOTE — PROGRESS NOTES
[facility] Cardiology Progress Note        Marybel Mean Patient Status:  Inpatient    1940 MRN [id number]   [facility] 5NW-A Attending Sotero Wooten MD   Hosp Day # 7 PCP Loreto Sanches MD     Subjective: the normal range,      estimated to be 28mm Hg. Impressions:  This study is compared with previous dated 12/17/2012:   *       Labs:  HEM:  Recent Labs   Lab 01/04/22  0723 01/05/22  0719   WBC 4.0 1.8*   HGB 13.1 13.0   .0 158. 0       Chem:  Re

## 2022-01-10 NOTE — CM/SW NOTE
Received notification from [facility] that patient/family have appealed discharge. Medical records called and notified of appeal; [facility] request, IM and Detailed Notice of Discharge sent via secure email.       SW left a message for [facility] to confirm they a

## 2022-01-11 VITALS
HEIGHT: 64 IN | BODY MASS INDEX: 36.28 KG/M2 | TEMPERATURE: 98 F | RESPIRATION RATE: 16 BRPM | HEART RATE: 62 BPM | OXYGEN SATURATION: 95 % | DIASTOLIC BLOOD PRESSURE: 68 MMHG | WEIGHT: 212.5 LBS | SYSTOLIC BLOOD PRESSURE: 123 MMHG

## 2022-01-11 NOTE — DISCHARGE SUMMARY
[facility] Discharge Summary    Patient ID:  Esme Mckeon  [id number]  55 year old  7/4/1940    Admit date: 1/2/2022  Discharge date and time: 1/11/22  Attending Physician: Saúl Porter MD  Primary Care Physician: Loree Gonzalez MD of discharge:  Gen: No acute distress, alert and oriented  CV: RRR, +s1/s2  Lungs: CTAB, good respiratory effort  Abdomen: s/nt/nd  Ext: Moves all 4 extremities, no c/c/e  Neuro: CN Intact, no focal deficits    Discharge meds     Medication List      START 1/3/2022  PROCEDURE:  CT BRAIN OR HEAD (28075)  COMPARISON:  [facility] , XR, XR CHEST AP PORTABLE  (CPT=71045), 1/02/2022, 11:21 PM.  [facility], CT, CT BRAIN OR HEAD (02688), 6/08/2021, 8:54 PM.  INDICATIONS:  possible exposure to covid, fever 99.6, weakness includes the Dose Index Registry. PATIENT STATED HISTORY:(As transcribed by Technologist)  Patient states COVID positive and incresed SOB.    CONTRAST USED:  100cc of Omnipaque 350  FINDINGS:  VASCULATURE:  There is no pulmonary embolism to the first subse was encountered, then additional duplex color flow imaging and Doppler spectral analysis was also used. PATIENT STATED HISTORY: (As transcribed by Technologist)  Patient offered no additional history at this time.     FINDINGS:  SAPHENOFEMORAL JUNCTION: Finalized by (CST): Johnie Kamara MD on 1/02/2022 at 11:50 PM       CARD ECHO 2D DOPPLER CONTRAST (CPT=93306)    Result Date: 1/6/2022                                                     *[address] Systolic function was normal. The estimated ejection fraction was 60%. No    regional wall motion abnormalities. The study is not technically    sufficient to allow evaluation of LV diastolic function. 2. Mitral valve: There was mild regurgitation.  3. Left stenosis. There was no regurgitation. Pericardium:  There was no pericardial effusion. Aorta: Aortic root: The aortic root was not dilated. Ascending aorta: The ascending aorta was normal. Pulmonary artery: The main pulmonary artery was normal-sized.  Sy LA volume, ES, 1-p A2C                  (H)     82    ml     22 - 52  LA volume/bsa, ES, 1-p A2C                      41    ml/m^2 ---------   Pulmonary arteries                              Value        Reference  PA pressure, S, DP

## 2022-01-11 NOTE — OCCUPATIONAL THERAPY NOTE
OCCUPATIONAL THERAPY TREATMENT NOTE - INPATIENT     Room Number: 172/487-R  Session: 1   Number of Visits to Meet Established Goals: 7    History:   Patient is a 79year old female admitted on 1/2/2022 with Presenting Problem: Hypotension, Covid-19 1/2/22. Exercises:    Exercises Repetitions Comments   Scapular elevation     Scapular retraction     Shoulder rolls     Shoulder flexion     Shoulder abduction     Shoulder internal/external rotation     Forward punch     Elbow flexion     Elbow extension Goals   Patient will perform eating: with set up  Patient will perform grooming: with setup  Patient will perform upper body bathing:  with min assist  Patient will perform toileting: with max assist     Functional Transfer Goals  Patient will transfer in

## 2022-01-11 NOTE — PLAN OF CARE
COVID-19 Daily Discharge Readiness-Nursing    O2 Sat at Rest:  SPO2% on Room Air at Rest: 93  %   O2 Sat with Exertion: 92% on RA  Temperature max from last 24 hrs: Temp (24hrs), Av.7 °F (36.5 °C), Min:97.6 °F (36.4 °C), Max:97.9 °F (36.6 °C)    In including physical limitations  - Instruct pt to call for assistance with activity based on assessment  - Modify environment to reduce risk of injury  - Provide assistive devices as appropriate  - Consider OT/PT consult to assist with strengthening/mobilit

## 2022-01-11 NOTE — PROGRESS NOTES
[facility]iology Progress Note        Mark Anthony Dyer Patient Status:  Inpatient    1940 MRN [id number]   [facility] 5NW-A Attending Angelic Forrester MD   Hosp Day # 8 PCP Terri Varghese MD     Subjective: estimated to be 28mm Hg.      Impressions:  This study is compared with previous dated 12/17/2012:   *       Labs:  HEM:  Recent Labs   Lab 01/05/22 0719   WBC 1.8*   HGB 13.0   .0       Chem:  Recent Labs   Lab 01/05/22  0719 01/06/22  0803 01/07/2

## 2022-01-11 NOTE — CM/SW NOTE
01/11/22 1556   Discharge disposition   Expected discharge disposition [facility]   BLS to arrive at 5:30pm. RN aware. RN to call report.  Nellene Horse from [facility]/daughter also aware of dc time  [place] [address]

## 2022-01-11 NOTE — PHYSICAL THERAPY NOTE
PHYSICAL THERAPY TREATMENT NOTE - INPATIENT    Room Number: 803/251-T     Session: 3    Number of Visits to Meet Established Goals: 6    Presenting Problem: COVID pna  Co-Morbidities : htn     History related to current admission: Patient is a 79 year o denies  Management Techniques:  Activity promotion    BALANCE                                                                                                                       Static Sitting: Good  Dynamic Sitting: Fair           Static Standing: Poor + Pt on room air, O2sat >90% throughout session.       THERAPEUTIC EXERCISES  Lower Extremity Alternating marching  Ankle pumps  LAQ     Upper Extremity      Position Sitting       Patient End of Session: Up in chair;Needs met;Call light within reach;RN aware

## 2022-01-11 NOTE — CM/SW NOTE
Sent updated clinicals to [facility]. Teddy Lala stated they could accept the pt after 5:30pm today. SW called to [employer] plan w/pt's daughter Lalita Hemphill. Lalita Vallejos stated Gerardo Reno is this a joke?  I already said I wanted her to go to [facility], the only reason I co

## 2022-01-11 NOTE — PLAN OF CARE
COVID-19 Daily Discharge Readiness-Nursing    O2 Sat at Rest:  SPO2% on Room Air at Rest: 90  %   O2 Sat with Exertion: SPO2% Ambulation on Oxygen: 89  % on Ambulation oxygen flow (liters per minute): 0  liters   Temperature max from last 24 hrs: Temp (24h plaN  01/09/2022 - FOR O2 SATS TO REMAIN STABLE ON ROOM AIR  1/9 AM: Awaiting for discharge placement  01/09/2022 \"I WANT NOTHING BUT SLEEP\"  01/10 NOC: sleep well     Interventions:   - IV steroids, o2, metoprolol, cards consult  - CLUSTER CARE, DIM LIG schedule  Outcome: Progressing     Problem: RESPIRATORY - ADULT  Goal: Achieves optimal ventilation and oxygenation  Description: INTERVENTIONS:  - Assess for changes in respiratory status  - Assess for changes in mentation and behavior  - Position to faci

## 2022-01-12 NOTE — PROGRESS NOTES
NURSING DISCHARGE NOTE    Discharged Rehab facility via Ambulance. Accompanied by Support staff  Belongings Taken by patient/family     Pt was discharged via ambulance. Gave ambulance paperwork and all questions answered. IV's removed, tele removed.  D

## 2022-01-12 NOTE — PAYOR COMM NOTE
--------------  CONTINUED STAY REVIEW    Xochitl Contreras [facility]  Subscriber #:  [id number]  Authorization Number: [id number]    Admit date: 1/3/22  Admit time:  3:06 AM    Admitting Physician: Lele Rushing MD  Attending Physician:  Roxana att. nicho f Vitals (last day) before discharge     Date/Time Temp Pulse Resp BP SpO2 Weight O2 Device O2 Flow Rate (L/min) [facility]    01/11/22 1248 98 °F (36.7 °C) — 16 123/68 — — — —     01/11/22 0921 97.7 °F (36.5 °C) — 16 149/67 — — — —     01/11/22 0515 97. 9 vanc/zosyn/azitrho, abx stopped  - CTH neg      # Acute renal failure-->resolved   - pre-renal 2/2 hypoTN, improving c IVF     # Elevated d-dimer  - empiric hep gtt --> proph --> now eliquis in setting of afib  - LE doppler neg PE   - cta neg     # Demetra Dominguez planning     1/10 CARDIOLOGY NOTE    Subjective:  Remains sinus rhythm. Hoping for dc.            Impression:  1. PAF - new diagnosis in setting of COVID-19.  - maintaining sinus bradycardia on amio. - on eliquis.    2. Covid pneumonia  - plan per primary tell her about [facility], but did not get a chance as daughter wanted to speak with a manager. Daughter stating pt has multiple insurance including [address] [place], [place], and an additional insurance.  Explained SW could double check with registation regarding in Summary (Last 24 hours) at 1/10/2022 1227  Last data filed at 1/10/2022 0900      Gross per 24 hour   Intake 360 ml   Output 1050 ml   Net -690 ml           Assessment/Plan:      1) covid PNA with acute hypoxemic resp failure  - dx'd 1/2, sick contacts at

## 2022-01-12 NOTE — CM/SW NOTE
Fred Gonzales RN, 01/12/22, 4:49 PM  Elinor Cai called and agreed with termination of services to start 1/13/22 at 12 noon. Pt has been discharged.

## 2022-01-12 NOTE — PAYOR COMM NOTE
--------------  DISCHARGE REVIEW    Secondary Payor: Justyn MORROW PPO  Subscriber #:  [id number]  Authorization Number: [id number]      Admit date: 1/3/22  Admit time:   3:06 AM  Discharge Date: 1/11/2022  6:24 PM     Admitting Physician: Sarah Dupont hospital course:   Pt was admitted with fever, cough. In ED was hypotensive with elevated lactate/ddimer. covid +. Pt was started on remdesivir and decadron for covid. Hypotension and lactate improved with IVF and empiric abx which were ultimately stopped. hydroCHLOROthiazide 12.5 MG Caps  Commonly known as: Abril Vergara           Where to Get Your Medications      These medications were sent to [address], [facility] Lexis Hartmann [phone], [address], [facility] Dictated by (CST): Jory Malloy MD on 1/03/2022 at 7:12 AM     Finalized by (CST): Jory Malloy MD on 1/03/2022 at 7:23 AM       CT ANGIOGRAPHY, CHEST (CPT=71275)    Result Date: 1/4/2022  PROCEDURE:  CT ANGIOGRAPHY, CHEST (CPT=71275)  COMPARISON 1/04/2022 at 7:25 PM     Finalized by (CST): Herminia Fernando MD on 1/04/2022 at 7:28 PM       Melodie Bourgeois IMG (CPT=93970)    Result Date: 1/3/2022  PROCEDURE:  US VENOUS DOPPLER LEG BILAT - DIAG IMG (CPT=93970)  COMPARISON:  None.   Zachary Lennox other etiologies not entirely excluded. Clinical correlation recommended. Possible small left pleural effusion. Cardiomegaly with prominence of the central pulmonary vascularity. Calcified plaque in the thoracic aorta. No pneumothorax.             CONC Doppler, color Doppler, and intravenous contrast injection. Inpatient. Room 515. Comparison was made to the study of 12/17/2012. This was a routine echocardiographic study.  Transthoracic echocardiography for ventricular function evaluation and assessmen was within the normal range. There was no evidence for stenosis. There was mild regurgitation. Aortic valve:   Structurally normal valve. Trileaflet. Cusp separation was normal.  Doppler:  Transvalvular velocity was within the normal range.  There was no st Value        Reference  Aortic root ID, ED                              3.0   cm     <4.1  Ascending aorta ID, A-P, ED                     2.9   cm     ---------   Left atrium                                     Value        Reference  LA ID, A-P, ES Patient had opportunity to ask questions and state understand and agree with therapeutic plan as outlined    I reconciled current and discharge medications on day of discharge.         Electronically signed by Meme Rees MD on 1/11/2022  4:34 PM

## 2022-01-12 NOTE — PAYOR COMM NOTE
--------------  DISCHARGE REVIEW    Payor: [address] [place] [address]  Subscriber #:  [id number]  Authorization Number: [id number]    Admit date: 1/3/22  Admit time:   3:06 AM  Discharge Date: 1/11/2022  6:24 PM     Admitting Physician: Sujit Lerma MD  Attendin was admitted with fever, cough. In ED was hypotensive with elevated lactate/ddimer. covid +. Pt was started on remdesivir and decadron for covid. Hypotension and lactate improved with IVF and empiric abx which were ultimately stopped.  Initially was on hepa 12.5 MG Caps  Commonly known as: Chantel Cierra           Where to Get Your Medications      These medications were sent to [address], [address] [phone], [address], [place] [address]    Phone: [phone] Enrique Pelaez MD on 1/03/2022 at 7:12 AM     Finalized by (CST): Enrique Pelaez MD on 1/03/2022 at 7:23 AM       CT ANGIOGRAPHY, CHEST (CPT=71275)    Result Date: 1/4/2022  PROCEDURE:  CT ANGIOGRAPHY, CHEST (CPT=71275)  COMPARISON:  [facility], CT, PM     Finalized by (CST): Maura Mckenzie MD on 1/04/2022 at 7:28 PM       Melodie Bourgeois IMG (CPT=93970)    Result Date: 1/3/2022  PROCEDURE:  US VENOUS DOPPLER LEG BILAT - DIAG IMG (CPT=93970)  COMPARISON:  None.   INDICATIONS:  swelling, not entirely excluded. Clinical correlation recommended. Possible small left pleural effusion. Cardiomegaly with prominence of the central pulmonary vascularity. Calcified plaque in the thoracic aorta. No pneumothorax.             CONCLUSION:  Patchy i Doppler, and intravenous contrast injection. Inpatient. Room 515. Comparison was made to the study of 12/17/2012. This was a routine echocardiographic study.  Transthoracic echocardiography for ventricular function evaluation and assessment of valvular f normal range. There was no evidence for stenosis. There was mild regurgitation. Aortic valve:   Structurally normal valve. Trileaflet. Cusp separation was normal.  Doppler:  Transvalvular velocity was within the normal range. There was no stenosis.   No reg Reference  Aortic root ID, ED                              3.0   cm     <4.1  Ascending aorta ID, A-P, ED                     2.9   cm     ---------   Left atrium                                     Value        Reference  LA ID, A-P, ES opportunity to ask questions and state understand and agree with therapeutic plan as outlined    I reconciled current and discharge medications on day of discharge.         Electronically signed by Feliciano Bumpers, MD on 1/11/2022  4:34 PM         REVIEWER COM

## 2022-01-13 NOTE — CM/SW NOTE
Received a call from grandson stating he would like SW to send an a referral to The [address] [place] [address] since they are unhappy with [facility]. Pt is out of network with The [address] [place] [address], but reopened the referral and re sent the information.

## 2022-01-14 PROBLEM — I48.91 ATRIAL FIBRILLATION, UNSPECIFIED TYPE (HCC): Status: ACTIVE | Noted: 2022-01-14

## 2022-01-25 PROBLEM — I48.0 PAF (PAROXYSMAL ATRIAL FIBRILLATION) (HCC): Status: ACTIVE | Noted: 2022-01-14

## 2022-04-25 ENCOUNTER — APPOINTMENT (OUTPATIENT)
Dept: GENERAL RADIOLOGY | Facility: HOSPITAL | Age: 82
End: 2022-04-25
Attending: EMERGENCY MEDICINE
Payer: MEDICARE

## 2022-04-25 ENCOUNTER — OFFICE VISIT (OUTPATIENT)
Dept: FAMILY MEDICINE CLINIC | Facility: CLINIC | Age: 82
End: 2022-04-25
Payer: MEDICARE

## 2022-04-25 ENCOUNTER — HOSPITAL ENCOUNTER (INPATIENT)
Facility: HOSPITAL | Age: 82
LOS: 1 days | Discharge: NURSING, REHAB OR LONG-TERM CARE | End: 2022-04-27
Attending: EMERGENCY MEDICINE | Admitting: HOSPITALIST
Payer: MEDICARE

## 2022-04-25 VITALS
SYSTOLIC BLOOD PRESSURE: 150 MMHG | RESPIRATION RATE: 24 BRPM | TEMPERATURE: 98 F | OXYGEN SATURATION: 90 % | DIASTOLIC BLOOD PRESSURE: 74 MMHG | HEART RATE: 63 BPM

## 2022-04-25 DIAGNOSIS — R09.02 HYPOXEMIA: Primary | ICD-10-CM

## 2022-04-25 DIAGNOSIS — R06.2 WHEEZING: ICD-10-CM

## 2022-04-25 DIAGNOSIS — J22 LOWER RESPIRATORY INFECTION: ICD-10-CM

## 2022-04-25 DIAGNOSIS — R06.02 SOB (SHORTNESS OF BREATH): Primary | ICD-10-CM

## 2022-04-25 LAB
ADENOVIRUS PCR:: NOT DETECTED
ALBUMIN SERPL-MCNC: 2.9 G/DL (ref 3.4–5)
ALBUMIN/GLOB SERPL: 0.6 {RATIO} (ref 1–2)
ALP LIVER SERPL-CCNC: 143 U/L
ALT SERPL-CCNC: 25 U/L
ANION GAP SERPL CALC-SCNC: 5 MMOL/L (ref 0–18)
AST SERPL-CCNC: 49 U/L (ref 15–37)
B PARAPERT DNA SPEC QL NAA+PROBE: NOT DETECTED
B PERT DNA SPEC QL NAA+PROBE: NOT DETECTED
BASOPHILS # BLD AUTO: 0.01 X10(3) UL (ref 0–0.2)
BASOPHILS NFR BLD AUTO: 0.2 %
BILIRUB SERPL-MCNC: 0.4 MG/DL (ref 0.1–2)
BUN BLD-MCNC: 13 MG/DL (ref 7–18)
C PNEUM DNA SPEC QL NAA+PROBE: NOT DETECTED
CALCIUM BLD-MCNC: 8.8 MG/DL (ref 8.5–10.1)
CHLORIDE SERPL-SCNC: 104 MMOL/L (ref 98–112)
CO2 SERPL-SCNC: 27 MMOL/L (ref 21–32)
CORONAVIRUS 229E PCR:: NOT DETECTED
CORONAVIRUS HKU1 PCR:: NOT DETECTED
CORONAVIRUS NL63 PCR:: NOT DETECTED
CORONAVIRUS OC43 PCR:: NOT DETECTED
CREAT BLD-MCNC: 1.01 MG/DL
D DIMER PPP FEU-MCNC: 0.4 UG/ML FEU (ref ?–0.82)
EOSINOPHIL # BLD AUTO: 0.08 X10(3) UL (ref 0–0.7)
EOSINOPHIL NFR BLD AUTO: 1.8 %
ERYTHROCYTE [DISTWIDTH] IN BLOOD BY AUTOMATED COUNT: 13.2 %
FLUAV RNA SPEC QL NAA+PROBE: NOT DETECTED
FLUBV RNA SPEC QL NAA+PROBE: NOT DETECTED
GLOBULIN PLAS-MCNC: 5 G/DL (ref 2.8–4.4)
GLUCOSE BLD-MCNC: 116 MG/DL (ref 70–99)
HCT VFR BLD AUTO: 43.9 %
HGB BLD-MCNC: 14.2 G/DL
IMM GRANULOCYTES # BLD AUTO: 0.01 X10(3) UL (ref 0–1)
IMM GRANULOCYTES NFR BLD: 0.2 %
LYMPHOCYTES # BLD AUTO: 1.88 X10(3) UL (ref 1–4)
LYMPHOCYTES NFR BLD AUTO: 41.5 %
MCH RBC QN AUTO: 30.9 PG (ref 26–34)
MCHC RBC AUTO-ENTMCNC: 32.3 G/DL (ref 31–37)
MCV RBC AUTO: 95.4 FL
METAPNEUMOVIRUS PCR:: NOT DETECTED
MONOCYTES # BLD AUTO: 0.5 X10(3) UL (ref 0.1–1)
MONOCYTES NFR BLD AUTO: 11 %
MYCOPLASMA PNEUMONIA PCR:: NOT DETECTED
NEUTROPHILS # BLD AUTO: 2.05 X10 (3) UL (ref 1.5–7.7)
NEUTROPHILS # BLD AUTO: 2.05 X10(3) UL (ref 1.5–7.7)
NEUTROPHILS NFR BLD AUTO: 45.3 %
NT-PROBNP SERPL-MCNC: 516 PG/ML (ref ?–450)
OSMOLALITY SERPL CALC.SUM OF ELEC: 283 MOSM/KG (ref 275–295)
PARAINFLUENZA 1 PCR:: NOT DETECTED
PARAINFLUENZA 2 PCR:: NOT DETECTED
PARAINFLUENZA 3 PCR:: NOT DETECTED
PARAINFLUENZA 4 PCR:: NOT DETECTED
PLATELET # BLD AUTO: 179 10(3)UL (ref 150–450)
POTASSIUM SERPL-SCNC: 3.5 MMOL/L (ref 3.5–5.1)
PROT SERPL-MCNC: 7.9 G/DL (ref 6.4–8.2)
RBC # BLD AUTO: 4.6 X10(6)UL
RHINOVIRUS/ENTERO PCR:: NOT DETECTED
RSV RNA SPEC QL NAA+PROBE: NOT DETECTED
SARS-COV-2 RNA NPH QL NAA+NON-PROBE: NOT DETECTED
SARS-COV-2 RNA RESP QL NAA+PROBE: NOT DETECTED
SODIUM SERPL-SCNC: 136 MMOL/L (ref 136–145)
TROPONIN I HIGH SENSITIVITY: 12 NG/L
WBC # BLD AUTO: 4.5 X10(3) UL (ref 4–11)

## 2022-04-25 PROCEDURE — 71045 X-RAY EXAM CHEST 1 VIEW: CPT | Performed by: EMERGENCY MEDICINE

## 2022-04-25 RX ORDER — DULOXETIN HYDROCHLORIDE 60 MG/1
60 CAPSULE, DELAYED RELEASE ORAL DAILY
COMMUNITY

## 2022-04-25 RX ORDER — PHENOL 1.4 %
1200 AEROSOL, SPRAY (ML) MUCOUS MEMBRANE DAILY
COMMUNITY

## 2022-04-25 RX ORDER — METHYLPREDNISOLONE SODIUM SUCCINATE 40 MG/ML
40 INJECTION, POWDER, LYOPHILIZED, FOR SOLUTION INTRAMUSCULAR; INTRAVENOUS EVERY 8 HOURS
Status: CANCELLED | OUTPATIENT
Start: 2022-04-26

## 2022-04-25 RX ORDER — METHYLPREDNISOLONE SODIUM SUCCINATE 125 MG/2ML
125 INJECTION, POWDER, LYOPHILIZED, FOR SOLUTION INTRAMUSCULAR; INTRAVENOUS ONCE
Status: COMPLETED | OUTPATIENT
Start: 2022-04-25 | End: 2022-04-25

## 2022-04-25 NOTE — ED INITIAL ASSESSMENT (HPI)
Fever thur and Friday- resolved since then. Cough x 1 week. States went to [facility] and sent for chest x ray. No body aches, denies pain.

## 2022-04-26 PROBLEM — R06.2 WHEEZING: Status: ACTIVE | Noted: 2022-04-26

## 2022-04-26 LAB — POTASSIUM SERPL-SCNC: 3.8 MMOL/L (ref 3.5–5.1)

## 2022-04-26 PROCEDURE — 99232 SBSQ HOSP IP/OBS MODERATE 35: CPT | Performed by: INTERNAL MEDICINE

## 2022-04-26 RX ORDER — PREDNISONE 20 MG/1
40 TABLET ORAL
Status: DISCONTINUED | OUTPATIENT
Start: 2022-04-26 | End: 2022-04-27

## 2022-04-26 RX ORDER — PANTOPRAZOLE SODIUM 40 MG/1
40 TABLET, DELAYED RELEASE ORAL
Refills: 3 | Status: DISCONTINUED | OUTPATIENT
Start: 2022-04-26 | End: 2022-04-27

## 2022-04-26 RX ORDER — AMLODIPINE BESYLATE 5 MG/1
5 TABLET ORAL DAILY
Status: DISCONTINUED | OUTPATIENT
Start: 2022-04-26 | End: 2022-04-27

## 2022-04-26 RX ORDER — ALBUTEROL SULFATE 2.5 MG/3ML
2.5 SOLUTION RESPIRATORY (INHALATION) EVERY 6 HOURS
Status: COMPLETED | OUTPATIENT
Start: 2022-04-26 | End: 2022-04-26

## 2022-04-26 RX ORDER — ACETAMINOPHEN 325 MG/1
650 TABLET ORAL EVERY 6 HOURS PRN
Status: DISCONTINUED | OUTPATIENT
Start: 2022-04-26 | End: 2022-04-27

## 2022-04-26 RX ORDER — POTASSIUM CHLORIDE 20 MEQ/1
40 TABLET, EXTENDED RELEASE ORAL EVERY 4 HOURS
Status: COMPLETED | OUTPATIENT
Start: 2022-04-26 | End: 2022-04-26

## 2022-04-26 RX ORDER — LEVOTHYROXINE SODIUM 0.1 MG/1
100 TABLET ORAL
Status: DISCONTINUED | OUTPATIENT
Start: 2022-04-26 | End: 2022-04-27

## 2022-04-26 RX ORDER — ONDANSETRON 2 MG/ML
4 INJECTION INTRAMUSCULAR; INTRAVENOUS EVERY 6 HOURS PRN
Status: DISCONTINUED | OUTPATIENT
Start: 2022-04-26 | End: 2022-04-27

## 2022-04-26 RX ORDER — MELATONIN
3 NIGHTLY PRN
Status: DISCONTINUED | OUTPATIENT
Start: 2022-04-26 | End: 2022-04-27

## 2022-04-26 RX ORDER — OXYBUTYNIN CHLORIDE 5 MG/1
10 TABLET, EXTENDED RELEASE ORAL DAILY
Status: DISCONTINUED | OUTPATIENT
Start: 2022-04-26 | End: 2022-04-27

## 2022-04-26 RX ORDER — SENNOSIDES 8.6 MG
17.2 TABLET ORAL NIGHTLY PRN
Status: DISCONTINUED | OUTPATIENT
Start: 2022-04-26 | End: 2022-04-27

## 2022-04-26 RX ORDER — BENZONATATE 100 MG/1
100 CAPSULE ORAL 3 TIMES DAILY PRN
Status: DISCONTINUED | OUTPATIENT
Start: 2022-04-26 | End: 2022-04-27

## 2022-04-26 RX ORDER — DULOXETIN HYDROCHLORIDE 30 MG/1
60 CAPSULE, DELAYED RELEASE ORAL DAILY
Status: DISCONTINUED | OUTPATIENT
Start: 2022-04-26 | End: 2022-04-27

## 2022-04-26 RX ORDER — POLYETHYLENE GLYCOL 3350 17 G/17G
17 POWDER, FOR SOLUTION ORAL DAILY PRN
Status: DISCONTINUED | OUTPATIENT
Start: 2022-04-26 | End: 2022-04-27

## 2022-04-26 RX ORDER — ALBUTEROL SULFATE 2.5 MG/3ML
2.5 SOLUTION RESPIRATORY (INHALATION) EVERY 6 HOURS
Status: DISCONTINUED | OUTPATIENT
Start: 2022-04-26 | End: 2022-04-26

## 2022-04-26 RX ORDER — ALBUTEROL SULFATE 2.5 MG/3ML
2.5 SOLUTION RESPIRATORY (INHALATION) EVERY 4 HOURS PRN
Status: DISCONTINUED | OUTPATIENT
Start: 2022-04-26 | End: 2022-04-27

## 2022-04-26 RX ORDER — AMIODARONE HYDROCHLORIDE 200 MG/1
200 TABLET ORAL DAILY
Status: DISCONTINUED | OUTPATIENT
Start: 2022-04-26 | End: 2022-04-27

## 2022-04-26 NOTE — PROGRESS NOTES
NURSING ADMISSION NOTE      Patient admitted via Cart  Oriented to room. Safety precautions initiated. Bed in low position. Call light in reach. Assumed care for this patient at [place] [address]. Admission navigator/PTA med list complete, needs to be verified. Patient alert and oriented x3, intermittently confused today per patient's daughter. NSR on tele. Maintaining o2 sats >90% on 2L NC. Denies SOB. C/o cough, requested cough meds. Updated patient and patient's daughter at bedside on POC, answered all questions at this time. Safety precautions put in place, bed alarm on.

## 2022-04-26 NOTE — ED QUICK NOTES
Pt dropped to 89 % without o2. MD notified. Pt placed back on 2L NC. Improvement to spo2 since being placed back on o2.

## 2022-04-26 NOTE — PLAN OF CARE
Problem: Patient/Family Goals  Goal: Patient/Family Long Term Goal  Description: Patient's Long Term Goal: Go home    Interventions:  - O2  - Steroids    - See additional Care Plan goals for specific interventions  Outcome: Progressing  Goal: Patient/Family Short Term Goal  Description: Patient's Short Term Goal: 4/26 Am: Feel better    Interventions:   - O2  - Steroids   - See additional Care Plan goals for specific interventions  Outcome: Progressing     Problem: PAIN - ADULT  Goal: Verbalizes/displays adequate comfort level or patient's stated pain goal  Description: INTERVENTIONS:  - Encourage pt to monitor pain and request assistance  - Assess pain using appropriate pain scale  - Administer analgesics based on type and severity of pain and evaluate response  - Implement non-pharmacological measures as appropriate and evaluate response  - Consider cultural and social influences on pain and pain management  - Manage/alleviate anxiety  - Utilize distraction and/or relaxation techniques  - Monitor for opioid side effects  - Notify MD/LIP if interventions unsuccessful or patient reports new pain  - Anticipate increased pain with activity and pre-medicate as appropriate  Outcome: Progressing     Problem: RISK FOR INFECTION - ADULT  Goal: Absence of fever/infection during anticipated neutropenic period  Description: INTERVENTIONS  - Monitor WBC  - Administer growth factors as ordered  - Implement neutropenic guidelines  Outcome: Progressing     Problem: SAFETY ADULT - FALL  Goal: Free from fall injury  Description: INTERVENTIONS:  - Assess pt frequently for physical needs  - Identify cognitive and physical deficits and behaviors that affect risk of falls.   - Haven fall precautions as indicated by assessment.  - Educate pt/family on patient safety including physical limitations  - Instruct pt to call for assistance with activity based on assessment  - Modify environment to reduce risk of injury  - Provide assistive devices as appropriate  - Consider OT/PT consult to assist with strengthening/mobility  - Encourage toileting schedule  Outcome: Progressing     Problem: DISCHARGE PLANNING  Goal: Discharge to home or other facility with appropriate resources  Description: INTERVENTIONS:  - Identify barriers to discharge w/pt and caregiver  - Include patient/family/discharge partner in discharge planning  - Arrange for needed discharge resources and transportation as appropriate  - Identify discharge learning needs (meds, wound care, etc)  - Arrange for interpreters to assist at discharge as needed  - Consider post-discharge preferences of patient/family/discharge partner  - Complete POLST form as appropriate  - Assess patient's ability to be responsible for managing their own health  - Refer to Case Management Department for coordinating discharge planning if the patient needs post-hospital services based on physician/LIP order or complex needs related to functional status, cognitive ability or social support system  Outcome: Progressing     Problem: RESPIRATORY - ADULT  Goal: Achieves optimal ventilation and oxygenation  Description: INTERVENTIONS:  - Assess for changes in respiratory status  - Assess for changes in mentation and behavior  - Position to facilitate oxygenation and minimize respiratory effort  - Oxygen supplementation based on oxygen saturation or ABGs  - Provide Smoking Cessation handout, if applicable  - Encourage broncho-pulmonary hygiene including cough, deep breathe, Incentive Spirometry  - Assess the need for suctioning and perform as needed  - Assess and instruct to report SOB or any respiratory difficulty  - Respiratory Therapy support as indicated  - Manage/alleviate anxiety  - Monitor for signs/symptoms of CO2 retention  Outcome: Progressing

## 2022-04-26 NOTE — CM/SW NOTE
Department  notified of request for keri COTTRE[facility] referrals started. Assigned CM/SW to follow up with pt/family on further discharge planning.      Reji Starks  [facility]

## 2022-04-26 NOTE — PROGRESS NOTES
Received pt Aox4 - confused at times. [place].  on 2L. Weaned to RA. Wheezes to lung fields. NSR on tele. Eliquis for DVT prophylaxis. Incontinent with a purewick. PO Prednisone. PT recommending RONIT. Pt up x1 with a walker. No further needs at this time.

## 2022-04-26 NOTE — ED QUICK NOTES
Orders for admission, patient is aware of plan and ready to go upstairs. Any questions, please call ED RN Kayce Mail  at extension [phone] . Vaccinated?  Yes  Type of COVID test sent: Rapid SARS  COVID Suspicion level: Low        Titratable drug(s) infusing:  Rate:     LOC at time of transport: Alert     Other pertinent information: Pt on 2L via NC     CIWA score= N/A  NIH score= N/A

## 2022-04-27 VITALS
HEART RATE: 68 BPM | TEMPERATURE: 98 F | SYSTOLIC BLOOD PRESSURE: 168 MMHG | BODY MASS INDEX: 46.01 KG/M2 | OXYGEN SATURATION: 92 % | RESPIRATION RATE: 20 BRPM | WEIGHT: 250 LBS | DIASTOLIC BLOOD PRESSURE: 67 MMHG | HEIGHT: 62 IN

## 2022-04-27 LAB — PROCALCITONIN SERPL-MCNC: 0.57 NG/ML (ref ?–0.16)

## 2022-04-27 PROCEDURE — 99239 HOSP IP/OBS DSCHRG MGMT >30: CPT | Performed by: HOSPITALIST

## 2022-04-27 RX ORDER — PREDNISONE 10 MG/1
TABLET ORAL
Qty: 20 TABLET | Refills: 0 | Status: SHIPPED | OUTPATIENT
Start: 2022-04-28 | End: 2022-05-06

## 2022-04-27 RX ORDER — BENZONATATE 100 MG/1
100 CAPSULE ORAL 3 TIMES DAILY PRN
Qty: 30 CAPSULE | Refills: 0 | Status: SHIPPED | OUTPATIENT
Start: 2022-04-27

## 2022-04-27 RX ORDER — ALBUTEROL SULFATE 90 UG/1
2 AEROSOL, METERED RESPIRATORY (INHALATION) EVERY 6 HOURS PRN
Qty: 1 EACH | Refills: 0 | Status: SHIPPED | OUTPATIENT
Start: 2022-04-27

## 2022-04-27 RX ORDER — AZITHROMYCIN 500 MG/1
500 TABLET, FILM COATED ORAL DAILY
Qty: 2 TABLET | Refills: 0 | Status: SHIPPED | OUTPATIENT
Start: 2022-04-28 | End: 2022-05-02 | Stop reason: ALTCHOICE

## 2022-04-27 RX ORDER — AZITHROMYCIN 250 MG/1
500 TABLET, FILM COATED ORAL
Status: DISCONTINUED | OUTPATIENT
Start: 2022-04-27 | End: 2022-04-27

## 2022-04-27 NOTE — CM/SW NOTE
[facility] accepted for [facility] and reserved in aidin.     Conor Toribio LCSW  /Discharge Planner  [phone]

## 2022-04-27 NOTE — PLAN OF CARE
AO x4, confused at times. [place]. Upper dentures. RA. Lung sounds wheezes. Nonproductive cough. Tele - NSR. I/O's. Eliquis. Incontinent. Purewick in place. Up standby x1/2 with walker. PO prednisone. Cardiac diet. Reports stiff neck pain, tylenol given with relief. Antitussive given for cough. Pt resting in bed with call light in reach. All questions answered. Updated pt on POC.        Problem: PAIN - ADULT  Goal: Verbalizes/displays adequate comfort level or patient's stated pain goal  Description: INTERVENTIONS:  - Encourage pt to monitor pain and request assistance  - Assess pain using appropriate pain scale  - Administer analgesics based on type and severity of pain and evaluate response  - Implement non-pharmacological measures as appropriate and evaluate response  - Consider cultural and social influences on pain and pain management  - Manage/alleviate anxiety  - Utilize distraction and/or relaxation techniques  - Monitor for opioid side effects  - Notify MD/LIP if interventions unsuccessful or patient reports new pain  - Anticipate increased pain with activity and pre-medicate as appropriate  Outcome: Progressing     Problem: RISK FOR INFECTION - ADULT  Goal: Absence of fever/infection during anticipated neutropenic period  Description: INTERVENTIONS  - Monitor WBC  - Administer growth factors as ordered  - Implement neutropenic guidelines  Outcome: Progressing

## 2022-04-27 NOTE — DISCHARGE PLANNING
NURSING DISCHARGE NOTE    Discharged [facility] facility via Ambulance. Accompanied by Support staff  Belongings Taken by patient/family. Discharge navigator complete  Discharge instructions reviewed, report called to RIKI Mendez at [facility]. All questions & concerns addressed at this time.

## 2022-04-27 NOTE — CM/SW NOTE
04/27/22 1408   Discharge disposition   Expected discharge disposition [address] at [facility]   Discharge transportation [facility]       Pt is set up for transport at 3:30pm via [address] ambulance. SW notified the facility and family has been notified. PCS complete.     Number to call report is [phone]) [address] Work Intern  [phone]

## 2022-04-27 NOTE — PLAN OF CARE
Problem: Patient/Family Goals  Goal: Patient/Family Long Term Goal  Description: Patient's Long Term Goal: Go home     Interventions:  - O2  - steroids  - See additional Care Plan goals for specific interventions  Outcome: Progressing  Goal: Patient/Family Short Term Goal  Outcome: Progressing   4/27 Am: Discharge    Problem: PAIN - ADULT  Goal: Verbalizes/displays adequate comfort level or patient's stated pain goal  Description: INTERVENTIONS:  - Encourage pt to monitor pain and request assistance  - Assess pain using appropriate pain scale  - Administer analgesics based on type and severity of pain and evaluate response  - Implement non-pharmacological measures as appropriate and evaluate response  - Consider cultural and social influences on pain and pain management  - Manage/alleviate anxiety  - Utilize distraction and/or relaxation techniques  - Monitor for opioid side effects  - Notify MD/LIP if interventions unsuccessful or patient reports new pain  - Anticipate increased pain with activity and pre-medicate as appropriate  Outcome: Progressing     Problem: RISK FOR INFECTION - ADULT  Goal: Absence of fever/infection during anticipated neutropenic period  Description: INTERVENTIONS  - Monitor WBC  - Administer growth factors as ordered  - Implement neutropenic guidelines  Outcome: Progressing     Problem: SAFETY ADULT - FALL  Goal: Free from fall injury  Description: INTERVENTIONS:  - Assess pt frequently for physical needs  - Identify cognitive and physical deficits and behaviors that affect risk of falls.   - [place] fall precautions as indicated by assessment.  - Educate pt/family on patient safety including physical limitations  - Instruct pt to call for assistance with activity based on assessment  - Modify environment to reduce risk of injury  - Provide assistive devices as appropriate  - Consider OT/PT consult to assist with strengthening/mobility  - Encourage toileting schedule  Outcome: Progressing     Problem: DISCHARGE PLANNING  Goal: Discharge to home or other facility with appropriate resources  Description: INTERVENTIONS:  - Identify barriers to discharge w/pt and caregiver  - Include patient/family/discharge partner in discharge planning  - Arrange for needed discharge resources and transportation as appropriate  - Identify discharge learning needs (meds, wound care, etc)  - Arrange for interpreters to assist at discharge as needed  - Consider post-discharge preferences of patient/family/discharge partner  - Complete POLST form as appropriate  - Assess patient's ability to be responsible for managing their own health  - Refer to Case Management Department for coordinating discharge planning if the patient needs post-hospital services based on physician/LIP order or complex needs related to functional status, cognitive ability or social support system  Outcome: Progressing     Problem: RESPIRATORY - ADULT  Goal: Achieves optimal ventilation and oxygenation  Description: INTERVENTIONS:  - Assess for changes in respiratory status  - Assess for changes in mentation and behavior  - Position to facilitate oxygenation and minimize respiratory effort  - Oxygen supplementation based on oxygen saturation or ABGs  - Provide Smoking Cessation handout, if applicable  - Encourage broncho-pulmonary hygiene including cough, deep breathe, Incentive Spirometry  - Assess the need for suctioning and perform as needed  - Assess and instruct to report SOB or any respiratory difficulty  - Respiratory Therapy support as indicated  - Manage/alleviate anxiety  - Monitor for signs/symptoms of CO2 retention  Outcome: Progressing

## 2022-04-28 ENCOUNTER — INITIAL APN SNF VISIT (OUTPATIENT)
Dept: INTERNAL MEDICINE CLINIC | Age: 82
End: 2022-04-28

## 2022-04-28 ENCOUNTER — NURSE ONLY (OUTPATIENT)
Dept: LAB | Age: 82
End: 2022-04-28
Attending: FAMILY MEDICINE
Payer: MEDICARE

## 2022-04-28 VITALS
OXYGEN SATURATION: 91 % | RESPIRATION RATE: 16 BRPM | WEIGHT: 202 LBS | SYSTOLIC BLOOD PRESSURE: 155 MMHG | TEMPERATURE: 98 F | HEART RATE: 71 BPM | BODY MASS INDEX: 37 KG/M2 | DIASTOLIC BLOOD PRESSURE: 75 MMHG

## 2022-04-28 DIAGNOSIS — Z11.52 ENCOUNTER FOR SCREENING FOR SEVERE ACUTE RESPIRATORY SYNDROME CORONAVIRUS 2 (SARS-COV-2) INFECTION: Primary | ICD-10-CM

## 2022-04-28 PROCEDURE — 1124F ACP DISCUSS-NO DSCNMKR DOCD: CPT | Performed by: NURSE PRACTITIONER

## 2022-04-28 PROCEDURE — 1111F DSCHRG MED/CURRENT MED MERGE: CPT | Performed by: NURSE PRACTITIONER

## 2022-04-28 PROCEDURE — 99310 SBSQ NF CARE HIGH MDM 45: CPT | Performed by: NURSE PRACTITIONER

## 2022-04-29 ENCOUNTER — NURSE ONLY (OUTPATIENT)
Dept: LAB | Age: 82
End: 2022-04-29
Attending: NURSE PRACTITIONER
Payer: MEDICARE

## 2022-04-29 DIAGNOSIS — U07.1 COVID: Primary | ICD-10-CM

## 2022-04-29 LAB
ALBUMIN SERPL-MCNC: 2.4 G/DL (ref 3.4–5)
ALBUMIN/GLOB SERPL: 0.6 {RATIO} (ref 1–2)
ALP LIVER SERPL-CCNC: 121 U/L
ALT SERPL-CCNC: 24 U/L
ANION GAP SERPL CALC-SCNC: 3 MMOL/L (ref 0–18)
AST SERPL-CCNC: 37 U/L (ref 15–37)
BASOPHILS # BLD AUTO: 0.01 X10(3) UL (ref 0–0.2)
BASOPHILS NFR BLD AUTO: 0.1 %
BILIRUB SERPL-MCNC: 0.7 MG/DL (ref 0.1–2)
BUN BLD-MCNC: 18 MG/DL (ref 7–18)
CALCIUM BLD-MCNC: 8.7 MG/DL (ref 8.5–10.1)
CHLORIDE SERPL-SCNC: 105 MMOL/L (ref 98–112)
CO2 SERPL-SCNC: 28 MMOL/L (ref 21–32)
CREAT BLD-MCNC: 0.71 MG/DL
EOSINOPHIL # BLD AUTO: 0.01 X10(3) UL (ref 0–0.7)
EOSINOPHIL NFR BLD AUTO: 0.1 %
ERYTHROCYTE [DISTWIDTH] IN BLOOD BY AUTOMATED COUNT: 13.2 %
FASTING STATUS PATIENT QL REPORTED: YES
GLOBULIN PLAS-MCNC: 4.3 G/DL (ref 2.8–4.4)
GLUCOSE BLD-MCNC: 74 MG/DL (ref 70–99)
HCT VFR BLD AUTO: 40.7 %
HGB BLD-MCNC: 12.9 G/DL
IMM GRANULOCYTES # BLD AUTO: 0.04 X10(3) UL (ref 0–1)
IMM GRANULOCYTES NFR BLD: 0.5 %
LYMPHOCYTES # BLD AUTO: 1.79 X10(3) UL (ref 1–4)
LYMPHOCYTES NFR BLD AUTO: 20.6 %
MCH RBC QN AUTO: 30.6 PG (ref 26–34)
MCHC RBC AUTO-ENTMCNC: 31.7 G/DL (ref 31–37)
MCV RBC AUTO: 96.7 FL
MONOCYTES # BLD AUTO: 0.96 X10(3) UL (ref 0.1–1)
MONOCYTES NFR BLD AUTO: 11 %
NEUTROPHILS # BLD AUTO: 5.9 X10 (3) UL (ref 1.5–7.7)
NEUTROPHILS # BLD AUTO: 5.9 X10(3) UL (ref 1.5–7.7)
NEUTROPHILS NFR BLD AUTO: 67.7 %
OSMOLALITY SERPL CALC.SUM OF ELEC: 283 MOSM/KG (ref 275–295)
PLATELET # BLD AUTO: 213 10(3)UL (ref 150–450)
POTASSIUM SERPL-SCNC: 4.2 MMOL/L (ref 3.5–5.1)
PROT SERPL-MCNC: 6.7 G/DL (ref 6.4–8.2)
RBC # BLD AUTO: 4.21 X10(6)UL
SARS-COV-2 RNA RESP QL NAA+PROBE: NOT DETECTED
SODIUM SERPL-SCNC: 136 MMOL/L (ref 136–145)
WBC # BLD AUTO: 8.7 X10(3) UL (ref 4–11)

## 2022-04-29 PROCEDURE — 85025 COMPLETE CBC W/AUTO DIFF WBC: CPT

## 2022-04-29 PROCEDURE — 80053 COMPREHEN METABOLIC PANEL: CPT

## 2022-05-02 ENCOUNTER — SNF VISIT (OUTPATIENT)
Dept: INTERNAL MEDICINE CLINIC | Age: 82
End: 2022-05-02

## 2022-05-02 VITALS
DIASTOLIC BLOOD PRESSURE: 87 MMHG | TEMPERATURE: 98 F | OXYGEN SATURATION: 91 % | SYSTOLIC BLOOD PRESSURE: 158 MMHG | RESPIRATION RATE: 20 BRPM | HEART RATE: 74 BPM

## 2022-05-04 ENCOUNTER — EXTERNAL FACILITY (OUTPATIENT)
Dept: FAMILY MEDICINE CLINIC | Facility: CLINIC | Age: 82
End: 2022-05-04

## 2022-05-04 ENCOUNTER — SNF VISIT (OUTPATIENT)
Dept: INTERNAL MEDICINE CLINIC | Age: 82
End: 2022-05-04

## 2022-05-04 VITALS
OXYGEN SATURATION: 94 % | HEART RATE: 66 BPM | DIASTOLIC BLOOD PRESSURE: 71 MMHG | SYSTOLIC BLOOD PRESSURE: 129 MMHG | TEMPERATURE: 98 F | RESPIRATION RATE: 18 BRPM

## 2022-05-04 DIAGNOSIS — E06.3 HYPOTHYROIDISM DUE TO HASHIMOTO'S THYROIDITIS: ICD-10-CM

## 2022-05-04 DIAGNOSIS — J98.01 BRONCHOSPASM: Primary | ICD-10-CM

## 2022-05-04 DIAGNOSIS — R06.2 WHEEZING: ICD-10-CM

## 2022-05-04 DIAGNOSIS — M48.061 SPINAL STENOSIS, LUMBAR REGION, WITHOUT NEUROGENIC CLAUDICATION: ICD-10-CM

## 2022-05-04 DIAGNOSIS — M81.0 AGE-RELATED OSTEOPOROSIS WITHOUT CURRENT PATHOLOGICAL FRACTURE: ICD-10-CM

## 2022-05-04 DIAGNOSIS — I95.9 HYPOTENSION, UNSPECIFIED HYPOTENSION TYPE: ICD-10-CM

## 2022-05-04 DIAGNOSIS — I48.0 PAF (PAROXYSMAL ATRIAL FIBRILLATION) (HCC): ICD-10-CM

## 2022-05-04 DIAGNOSIS — R53.1 GENERALIZED WEAKNESS: ICD-10-CM

## 2022-05-04 DIAGNOSIS — E66.01 MORBID OBESITY WITH BODY MASS INDEX (BMI) OF 40.0 TO 44.9 IN ADULT (HCC): ICD-10-CM

## 2022-05-04 DIAGNOSIS — E03.8 HYPOTHYROIDISM DUE TO HASHIMOTO'S THYROIDITIS: ICD-10-CM

## 2022-05-04 DIAGNOSIS — I10 ESSENTIAL HYPERTENSION, BENIGN: ICD-10-CM

## 2022-05-04 DIAGNOSIS — R53.81 PHYSICAL DECONDITIONING: ICD-10-CM

## 2022-05-04 DIAGNOSIS — M47.812 CERVICAL SPONDYLOSIS WITHOUT MYELOPATHY: ICD-10-CM

## 2022-05-04 DIAGNOSIS — R09.02 HYPOXEMIA: ICD-10-CM

## 2022-05-04 PROCEDURE — 1111F DSCHRG MED/CURRENT MED MERGE: CPT | Performed by: FAMILY MEDICINE

## 2022-05-04 PROCEDURE — 99308 SBSQ NF CARE LOW MDM 20: CPT | Performed by: NURSE PRACTITIONER

## 2022-05-04 PROCEDURE — 99306 1ST NF CARE HIGH MDM 50: CPT | Performed by: FAMILY MEDICINE

## 2022-05-04 PROCEDURE — 1111F DSCHRG MED/CURRENT MED MERGE: CPT | Performed by: NURSE PRACTITIONER

## 2022-05-05 ENCOUNTER — NURSE ONLY (OUTPATIENT)
Dept: LAB | Age: 82
End: 2022-05-05
Attending: NURSE PRACTITIONER
Payer: MEDICARE

## 2022-05-05 DIAGNOSIS — Z11.52 ENCOUNTER FOR SCREENING FOR SEVERE ACUTE RESPIRATORY SYNDROME CORONAVIRUS 2 (SARS-COV-2) INFECTION: ICD-10-CM

## 2022-05-05 DIAGNOSIS — E56.9 MULTIPLE VITAMIN DEFICIENCY DISEASE: Primary | ICD-10-CM

## 2022-05-05 LAB
ALBUMIN SERPL-MCNC: 1.9 G/DL (ref 3.4–5)
ALBUMIN/GLOB SERPL: 0.5 {RATIO} (ref 1–2)
ALP LIVER SERPL-CCNC: 150 U/L
ALT SERPL-CCNC: 33 U/L
ANION GAP SERPL CALC-SCNC: 4 MMOL/L (ref 0–18)
AST SERPL-CCNC: 39 U/L (ref 15–37)
BASOPHILS # BLD AUTO: 0.03 X10(3) UL (ref 0–0.2)
BASOPHILS NFR BLD AUTO: 0.4 %
BILIRUB SERPL-MCNC: 0.6 MG/DL (ref 0.1–2)
BUN BLD-MCNC: 16 MG/DL (ref 7–18)
CALCIUM BLD-MCNC: 7.9 MG/DL (ref 8.5–10.1)
CHLORIDE SERPL-SCNC: 106 MMOL/L (ref 98–112)
CO2 SERPL-SCNC: 28 MMOL/L (ref 21–32)
CREAT BLD-MCNC: 0.65 MG/DL
EOSINOPHIL # BLD AUTO: 0.13 X10(3) UL (ref 0–0.7)
EOSINOPHIL NFR BLD AUTO: 1.9 %
ERYTHROCYTE [DISTWIDTH] IN BLOOD BY AUTOMATED COUNT: 12.9 %
FASTING STATUS PATIENT QL REPORTED: YES
GLOBULIN PLAS-MCNC: 4.1 G/DL (ref 2.8–4.4)
GLUCOSE BLD-MCNC: 79 MG/DL (ref 70–99)
HCT VFR BLD AUTO: 37.3 %
HGB BLD-MCNC: 11.8 G/DL
IMM GRANULOCYTES # BLD AUTO: 0.05 X10(3) UL (ref 0–1)
IMM GRANULOCYTES NFR BLD: 0.7 %
LYMPHOCYTES # BLD AUTO: 1.34 X10(3) UL (ref 1–4)
LYMPHOCYTES NFR BLD AUTO: 20 %
MCH RBC QN AUTO: 30.6 PG (ref 26–34)
MCHC RBC AUTO-ENTMCNC: 31.6 G/DL (ref 31–37)
MCV RBC AUTO: 96.9 FL
MONOCYTES # BLD AUTO: 0.65 X10(3) UL (ref 0.1–1)
MONOCYTES NFR BLD AUTO: 9.7 %
NEUTROPHILS # BLD AUTO: 4.51 X10 (3) UL (ref 1.5–7.7)
NEUTROPHILS # BLD AUTO: 4.51 X10(3) UL (ref 1.5–7.7)
NEUTROPHILS NFR BLD AUTO: 67.3 %
OSMOLALITY SERPL CALC.SUM OF ELEC: 286 MOSM/KG (ref 275–295)
PLATELET # BLD AUTO: 193 10(3)UL (ref 150–450)
POTASSIUM SERPL-SCNC: 4 MMOL/L (ref 3.5–5.1)
PROT SERPL-MCNC: 6 G/DL (ref 6.4–8.2)
RBC # BLD AUTO: 3.85 X10(6)UL
SODIUM SERPL-SCNC: 138 MMOL/L (ref 136–145)
WBC # BLD AUTO: 6.7 X10(3) UL (ref 4–11)

## 2022-05-05 PROCEDURE — 80053 COMPREHEN METABOLIC PANEL: CPT

## 2022-05-05 PROCEDURE — 85025 COMPLETE CBC W/AUTO DIFF WBC: CPT

## 2022-05-06 LAB — SARS-COV-2 RNA RESP QL NAA+PROBE: NOT DETECTED

## 2022-05-09 ENCOUNTER — SNF VISIT (OUTPATIENT)
Dept: INTERNAL MEDICINE CLINIC | Age: 82
End: 2022-05-09

## 2022-05-09 PROCEDURE — 99308 SBSQ NF CARE LOW MDM 20: CPT | Performed by: NURSE PRACTITIONER

## 2022-05-09 PROCEDURE — 1111F DSCHRG MED/CURRENT MED MERGE: CPT | Performed by: NURSE PRACTITIONER

## 2022-05-10 ENCOUNTER — EXTERNAL FACILITY (OUTPATIENT)
Dept: FAMILY MEDICINE CLINIC | Facility: CLINIC | Age: 82
End: 2022-05-10

## 2022-05-10 VITALS
RESPIRATION RATE: 20 BRPM | TEMPERATURE: 98 F | SYSTOLIC BLOOD PRESSURE: 152 MMHG | HEART RATE: 66 BPM | BODY MASS INDEX: 37 KG/M2 | WEIGHT: 202 LBS | DIASTOLIC BLOOD PRESSURE: 88 MMHG | OXYGEN SATURATION: 92 %

## 2022-05-10 DIAGNOSIS — E03.9 HYPOTHYROIDISM, UNSPECIFIED TYPE: Primary | ICD-10-CM

## 2022-05-12 ENCOUNTER — SNF DISCHARGE (OUTPATIENT)
Dept: INTERNAL MEDICINE CLINIC | Age: 82
End: 2022-05-12

## 2022-05-12 ENCOUNTER — NURSE ONLY (OUTPATIENT)
Dept: LAB | Age: 82
End: 2022-05-12
Attending: FAMILY MEDICINE
Payer: MEDICARE

## 2022-05-12 VITALS
OXYGEN SATURATION: 92 % | WEIGHT: 202 LBS | HEART RATE: 69 BPM | BODY MASS INDEX: 37 KG/M2 | DIASTOLIC BLOOD PRESSURE: 91 MMHG | RESPIRATION RATE: 20 BRPM | SYSTOLIC BLOOD PRESSURE: 164 MMHG | TEMPERATURE: 98 F

## 2022-05-12 DIAGNOSIS — E56.9 MULTIPLE VITAMIN DEFICIENCY DISEASE: Primary | ICD-10-CM

## 2022-05-12 LAB
ALBUMIN SERPL-MCNC: 2.2 G/DL (ref 3.4–5)
ALBUMIN/GLOB SERPL: 0.4 {RATIO} (ref 1–2)
ALP LIVER SERPL-CCNC: 130 U/L
ALT SERPL-CCNC: 23 U/L
ANION GAP SERPL CALC-SCNC: 7 MMOL/L (ref 0–18)
AST SERPL-CCNC: 33 U/L (ref 15–37)
BASOPHILS # BLD AUTO: 0.03 X10(3) UL (ref 0–0.2)
BASOPHILS NFR BLD AUTO: 0.6 %
BILIRUB SERPL-MCNC: 0.4 MG/DL (ref 0.1–2)
BUN BLD-MCNC: 13 MG/DL (ref 7–18)
CALCIUM BLD-MCNC: 8.9 MG/DL (ref 8.5–10.1)
CHLORIDE SERPL-SCNC: 107 MMOL/L (ref 98–112)
CO2 SERPL-SCNC: 25 MMOL/L (ref 21–32)
CREAT BLD-MCNC: 0.86 MG/DL
EOSINOPHIL # BLD AUTO: 0.19 X10(3) UL (ref 0–0.7)
EOSINOPHIL NFR BLD AUTO: 3.6 %
ERYTHROCYTE [DISTWIDTH] IN BLOOD BY AUTOMATED COUNT: 13.2 %
FASTING STATUS PATIENT QL REPORTED: YES
GLOBULIN PLAS-MCNC: 5.1 G/DL (ref 2.8–4.4)
GLUCOSE BLD-MCNC: 81 MG/DL (ref 70–99)
HCT VFR BLD AUTO: 39.5 %
HGB BLD-MCNC: 12.4 G/DL
IMM GRANULOCYTES # BLD AUTO: 0.03 X10(3) UL (ref 0–1)
IMM GRANULOCYTES NFR BLD: 0.6 %
LYMPHOCYTES # BLD AUTO: 1.35 X10(3) UL (ref 1–4)
LYMPHOCYTES NFR BLD AUTO: 25.5 %
MCH RBC QN AUTO: 30.4 PG (ref 26–34)
MCHC RBC AUTO-ENTMCNC: 31.4 G/DL (ref 31–37)
MCV RBC AUTO: 96.8 FL
MONOCYTES # BLD AUTO: 0.44 X10(3) UL (ref 0.1–1)
MONOCYTES NFR BLD AUTO: 8.3 %
NEUTROPHILS # BLD AUTO: 3.25 X10 (3) UL (ref 1.5–7.7)
NEUTROPHILS # BLD AUTO: 3.25 X10(3) UL (ref 1.5–7.7)
NEUTROPHILS NFR BLD AUTO: 61.4 %
OSMOLALITY SERPL CALC.SUM OF ELEC: 287 MOSM/KG (ref 275–295)
PLATELET # BLD AUTO: 241 10(3)UL (ref 150–450)
POTASSIUM SERPL-SCNC: 4.1 MMOL/L (ref 3.5–5.1)
PROT SERPL-MCNC: 7.3 G/DL (ref 6.4–8.2)
RBC # BLD AUTO: 4.08 X10(6)UL
SODIUM SERPL-SCNC: 139 MMOL/L (ref 136–145)
WBC # BLD AUTO: 5.3 X10(3) UL (ref 4–11)

## 2022-05-12 PROCEDURE — 1111F DSCHRG MED/CURRENT MED MERGE: CPT | Performed by: NURSE PRACTITIONER

## 2022-05-12 PROCEDURE — 85025 COMPLETE CBC W/AUTO DIFF WBC: CPT

## 2022-05-12 PROCEDURE — 99316 NF DSCHRG MGMT 30 MIN+: CPT | Performed by: NURSE PRACTITIONER

## 2022-05-12 PROCEDURE — 80053 COMPREHEN METABOLIC PANEL: CPT

## 2022-05-12 NOTE — PATIENT INSTRUCTIONS
Follow Up Appointments:    Please call the  at [phone] to schedule all of your follow up appointments in just one call. Upon your discharge home, please make appointments with the following providers:    Nitza Camargo MD within 7 days of DC. Pulmonary consult as outpatient call for appointment. [facility]. [address] [place] [address]  [phone]  Schedule an appointment as soon as possible for a visit  after discharge from rehab   [facility] care has been arranged for you on discharge with the following disciplines:    Registered nurse/physical therapy/occupational therapy/bath aide.

## 2022-05-16 ENCOUNTER — TELEPHONE (OUTPATIENT)
Dept: FAMILY MEDICINE CLINIC | Facility: CLINIC | Age: 82
End: 2022-05-16

## 2022-05-16 ENCOUNTER — PATIENT OUTREACH (OUTPATIENT)
Dept: CASE MANAGEMENT | Age: 82
End: 2022-05-16

## 2022-05-16 NOTE — PROGRESS NOTES
OSWALDOSYLVIA for post hospital follow up. [facility] contact information provided as well as [facility] office number, [phone].

## 2022-05-16 NOTE — TELEPHONE ENCOUNTER
Spoke to Justice Patiño with [facility], advised her that we have not seen this patient and she hasn't established care with our office. Justice Patiño said Dr. Madhu Pappas and Dr. Delonte Waldron are listed for patient, advised Dr. Delonte Waldron is likely listed because she was just [facility] landing. Pt last saw Dr. Benny Sanders in February. Justice Patiño verbalized understanding and agreement. All questions answered.

## 2022-05-17 PROBLEM — R53.1 GENERALIZED WEAKNESS: Status: ACTIVE | Noted: 2022-05-17

## 2022-05-17 PROBLEM — R53.81 PHYSICAL DECONDITIONING: Status: ACTIVE | Noted: 2022-05-17

## 2022-05-17 PROBLEM — I95.9 HYPOTENSION: Status: RESOLVED | Noted: 2022-01-03 | Resolved: 2022-05-17

## 2022-05-17 NOTE — PROGRESS NOTES
OSWALDOSYLVIA for post hospital follow up. [facility] contact information provided as well as [facility] office number, [phone].

## 2022-06-03 NOTE — PROGRESS NOTES
Multiple attempts to reach pt and messages left with no return call. Past TCM timeframe. Of note patient has been in contact with outside PCP from [address] [place], [place] [address] [place]. Encounter closing.

## 2022-06-28 ENCOUNTER — OFFICE VISIT (OUTPATIENT)
Dept: FAMILY MEDICINE CLINIC | Facility: CLINIC | Age: 82
End: 2022-06-28
Payer: MEDICARE

## 2022-06-28 VITALS
WEIGHT: 210 LBS | OXYGEN SATURATION: 96 % | HEART RATE: 64 BPM | HEIGHT: 64 IN | RESPIRATION RATE: 18 BRPM | DIASTOLIC BLOOD PRESSURE: 60 MMHG | BODY MASS INDEX: 35.85 KG/M2 | SYSTOLIC BLOOD PRESSURE: 130 MMHG | TEMPERATURE: 98 F

## 2022-06-28 DIAGNOSIS — R25.1 TREMORS OF NERVOUS SYSTEM: ICD-10-CM

## 2022-06-28 DIAGNOSIS — M19.90 OSTEOARTHRITIS, UNSPECIFIED OSTEOARTHRITIS TYPE, UNSPECIFIED SITE: ICD-10-CM

## 2022-06-28 DIAGNOSIS — R53.81 PHYSICAL DECONDITIONING: ICD-10-CM

## 2022-06-28 DIAGNOSIS — H91.93 HEARING DIFFICULTY OF BOTH EARS: ICD-10-CM

## 2022-06-28 DIAGNOSIS — R09.02 HYPOXEMIA: Primary | ICD-10-CM

## 2022-06-28 PROCEDURE — 99213 OFFICE O/P EST LOW 20 MIN: CPT | Performed by: FAMILY MEDICINE

## 2022-06-29 ENCOUNTER — TELEPHONE (OUTPATIENT)
Dept: FAMILY MEDICINE CLINIC | Facility: CLINIC | Age: 82
End: 2022-06-29

## 2022-06-29 NOTE — TELEPHONE ENCOUNTER
Svitlana Young with [facility] is calling to ask if Monday 07/04/2022 is OK for visit?     Please advise [address]

## 2022-06-29 NOTE — TELEPHONE ENCOUNTER
Keyona Dow with [facility] is calling to discuss office note. Office note doesn't contain information for Home Health and medicare will not cover Home Hlealth. Can Osteo Arthritis be added to note from last visit.     Please advise Siena Meyer [phone]

## 2022-06-30 NOTE — TELEPHONE ENCOUNTER
Henna Trinidad from [facility] called. Need to actually have left hip osteoarthritis written in note in order for medicare to cover. Cannot only be linked to diagnosis.

## 2022-07-05 ENCOUNTER — TELEPHONE (OUTPATIENT)
Dept: FAMILY MEDICINE CLINIC | Facility: CLINIC | Age: 82
End: 2022-07-05

## 2022-07-05 NOTE — TELEPHONE ENCOUNTER
Orders from , but they are unable to reach patient or her contacts to schedule care, She is asking for a verbal ok on the orders until Thursday, where she can have more time to reach out.

## 2022-07-05 NOTE — TELEPHONE ENCOUNTER
Spoke to Actiance at [email or web address].    Provided v.o. ok to keep trying-   Confirmed we have same phone numbers for patient and her daughters

## 2022-07-07 NOTE — TELEPHONE ENCOUNTER
Rai Bedolla was able to contact daughter for when she should start home health. She states that she would like Sunday. Can she get a verbal ok to proceed?

## 2022-07-22 ENCOUNTER — TELEPHONE (OUTPATIENT)
Dept: FAMILY MEDICINE CLINIC | Facility: CLINIC | Age: 82
End: 2022-07-22

## 2022-07-22 NOTE — TELEPHONE ENCOUNTER
Noted.
Shannon Media from [facility] called and said Carmen Alvarez fell yesterday as she was returning to her home from an appt. She was tired and weak. Her daughter said she did go down slowly and family members helped her up and she was not get severely hurt. She has had pain in her left left shoulder and the fall aggravated it.
home

## 2022-07-25 ENCOUNTER — TELEPHONE (OUTPATIENT)
Dept: FAMILY MEDICINE CLINIC | Facility: CLINIC | Age: 82
End: 2022-07-25

## 2022-07-25 NOTE — TELEPHONE ENCOUNTER
Steffen Thompson from [facility] called and said she needs an order for community resources and homemaker assistance. Steffen Thompson and be reached at [phone].

## 2022-08-08 ENCOUNTER — TELEPHONE (OUTPATIENT)
Dept: FAMILY MEDICINE CLINIC | Facility: CLINIC | Age: 82
End: 2022-08-08

## 2022-08-08 NOTE — TELEPHONE ENCOUNTER
Brain Colin from [facility] called and said pt will continue PT 2 times a week for 2 weeks starting today. On eliquis for Afib.

## 2022-08-10 ENCOUNTER — HOSPITAL ENCOUNTER (EMERGENCY)
Age: 82
Discharge: HOME | End: 2022-08-10
Attending: EMERGENCY MEDICINE
Payer: MEDICARE

## 2022-08-10 ENCOUNTER — TELEPHONE (OUTPATIENT)
Dept: FAMILY MEDICINE CLINIC | Facility: CLINIC | Age: 82
End: 2022-08-10

## 2022-08-10 ENCOUNTER — APPOINTMENT (OUTPATIENT)
Dept: CT IMAGING | Age: 82
End: 2022-08-10
Attending: EMERGENCY MEDICINE
Payer: MEDICARE

## 2022-08-10 ENCOUNTER — APPOINTMENT (OUTPATIENT)
Dept: GENERAL RADIOLOGY | Age: 82
End: 2022-08-10
Attending: EMERGENCY MEDICINE
Payer: MEDICARE

## 2022-08-10 VITALS
OXYGEN SATURATION: 99 % | RESPIRATION RATE: 16 BRPM | DIASTOLIC BLOOD PRESSURE: 64 MMHG | HEART RATE: 80 BPM | BODY MASS INDEX: 36 KG/M2 | SYSTOLIC BLOOD PRESSURE: 130 MMHG | WEIGHT: 210.13 LBS | TEMPERATURE: 98 F

## 2022-08-10 DIAGNOSIS — S92.911A CLOSED NONDISPLACED FRACTURE OF PHALANX OF TOE OF RIGHT FOOT, UNSPECIFIED TOE, INITIAL ENCOUNTER: Primary | ICD-10-CM

## 2022-08-10 DIAGNOSIS — S09.90XA INJURY OF HEAD, INITIAL ENCOUNTER: ICD-10-CM

## 2022-08-10 DIAGNOSIS — S29.011A MUSCLE STRAIN OF CHEST WALL, INITIAL ENCOUNTER: ICD-10-CM

## 2022-08-10 PROCEDURE — 99284 EMERGENCY DEPT VISIT MOD MDM: CPT

## 2022-08-10 PROCEDURE — 93010 ELECTROCARDIOGRAM REPORT: CPT

## 2022-08-10 PROCEDURE — 93005 ELECTROCARDIOGRAM TRACING: CPT

## 2022-08-10 PROCEDURE — 99285 EMERGENCY DEPT VISIT HI MDM: CPT

## 2022-08-10 PROCEDURE — 28510 TREATMENT OF TOE FRACTURE: CPT

## 2022-08-10 PROCEDURE — 73630 X-RAY EXAM OF FOOT: CPT | Performed by: EMERGENCY MEDICINE

## 2022-08-10 PROCEDURE — 70450 CT HEAD/BRAIN W/O DYE: CPT | Performed by: EMERGENCY MEDICINE

## 2022-08-10 PROCEDURE — 71045 X-RAY EXAM CHEST 1 VIEW: CPT | Performed by: EMERGENCY MEDICINE

## 2022-08-10 PROCEDURE — 90471 IMMUNIZATION ADMIN: CPT

## 2022-08-10 RX ORDER — ONDANSETRON 4 MG/1
4 TABLET, ORALLY DISINTEGRATING ORAL ONCE
Status: COMPLETED | OUTPATIENT
Start: 2022-08-10 | End: 2022-08-10

## 2022-08-10 RX ORDER — ONDANSETRON 4 MG/1
4 TABLET, ORALLY DISINTEGRATING ORAL EVERY 4 HOURS PRN
Qty: 10 TABLET | Refills: 0 | Status: SHIPPED | OUTPATIENT
Start: 2022-08-10 | End: 2022-08-17

## 2022-08-10 RX ORDER — HYDROCODONE BITARTRATE AND ACETAMINOPHEN 5; 325 MG/1; MG/1
1-2 TABLET ORAL EVERY 6 HOURS PRN
Qty: 10 TABLET | Refills: 0 | Status: SHIPPED | OUTPATIENT
Start: 2022-08-10 | End: 2022-08-15

## 2022-08-10 RX ORDER — HYDROCODONE BITARTRATE AND ACETAMINOPHEN 5; 325 MG/1; MG/1
1 TABLET ORAL ONCE
Status: COMPLETED | OUTPATIENT
Start: 2022-08-10 | End: 2022-08-10

## 2022-08-10 NOTE — TELEPHONE ENCOUNTER
Gen Barron states pt fell during the night last night, pt fell onto her right side and upper right \"chest plate\" is where pt is painful. Gen Barron asking if they can wait a few days and see how pt's pain is or if pt should be taken to the ER. This writer advised that pt should be seen in the ER as she is experiencing pain and is blood thinners, Gen Barron verbalized understanding and will take pt to ER. No

## 2022-08-11 ENCOUNTER — TELEPHONE (OUTPATIENT)
Dept: FAMILY MEDICINE CLINIC | Facility: CLINIC | Age: 82
End: 2022-08-11

## 2022-08-11 LAB
ATRIAL RATE: 56 BPM
P-R INTERVAL: 180 MS
Q-T INTERVAL: 474 MS
QRS DURATION: 84 MS
QTC CALCULATION (BEZET): 457 MS
R AXIS: -34 DEGREES
T AXIS: -11 DEGREES
VENTRICULAR RATE: 56 BPM

## 2022-08-11 NOTE — TELEPHONE ENCOUNTER
Pt is receiving home health. Tuesday night/early Weds. Emergency room yesterday and fracture of right second toe. Complaining of pain in chest in right upper chest. X rays of chest was done and no fractures or breaks. But does have muscle sprain. MRI head and EKG also done and was fine. She was scheduled for physical therapy today, but pt declined as she is too sore. Follow up with the patient is next week.

## 2022-08-11 NOTE — TELEPHONE ENCOUNTER
See MACO PIZANO. Pt's daughter called yesterday and was advised to take pt to ER after fall, pt was seen in the ER 8/10/22.

## 2022-08-22 ENCOUNTER — TELEPHONE (OUTPATIENT)
Dept: FAMILY MEDICINE CLINIC | Facility: CLINIC | Age: 82
End: 2022-08-22

## 2022-08-22 RX ORDER — GABAPENTIN 100 MG/1
100 CAPSULE ORAL NIGHTLY
Qty: 30 CAPSULE | Refills: 0 | Status: SHIPPED | OUTPATIENT
Start: 2022-08-22

## 2022-08-22 NOTE — TELEPHONE ENCOUNTER
Dr. Coral Tejada. Pt's family would like to know if patient could get anything else for pain. Left sternal pain after fall. Pain overall has gone down. Pt is taking 6 tabs of tylenol daily. Takes Eliquis. Pt is scheduled to be discharged from PT on 08/23/2022.     Please call [facility].[phone]

## 2022-08-22 NOTE — TELEPHONE ENCOUNTER
Spoke with Ángel Fleming, PT at [facility], and advised that prescription for gabapentin 100 mg to be taken nightly has been sent to the pharmacy. Ángel Fleming thanked for the return call and states she will notify the family.

## 2022-10-15 ENCOUNTER — APPOINTMENT (OUTPATIENT)
Dept: GENERAL RADIOLOGY | Age: 82
End: 2022-10-15
Attending: PHYSICIAN ASSISTANT
Payer: MEDICARE

## 2022-10-15 ENCOUNTER — APPOINTMENT (OUTPATIENT)
Dept: ULTRASOUND IMAGING | Age: 82
End: 2022-10-15
Attending: PHYSICIAN ASSISTANT
Payer: MEDICARE

## 2022-10-15 ENCOUNTER — HOSPITAL ENCOUNTER (EMERGENCY)
Age: 82
Discharge: HOME | End: 2022-10-15
Attending: PHYSICIAN ASSISTANT
Payer: MEDICARE

## 2022-10-15 VITALS
RESPIRATION RATE: 18 BRPM | SYSTOLIC BLOOD PRESSURE: 145 MMHG | BODY MASS INDEX: 37.39 KG/M2 | DIASTOLIC BLOOD PRESSURE: 62 MMHG | OXYGEN SATURATION: 98 % | WEIGHT: 211 LBS | HEART RATE: 62 BPM | TEMPERATURE: 98 F | HEIGHT: 63 IN

## 2022-10-15 DIAGNOSIS — L03.116 CELLULITIS OF LEFT LOWER EXTREMITY: ICD-10-CM

## 2022-10-15 DIAGNOSIS — S80.01XA CONTUSION OF RIGHT KNEE, INITIAL ENCOUNTER: ICD-10-CM

## 2022-10-15 DIAGNOSIS — S60.031A CONTUSION OF RIGHT MIDDLE FINGER WITHOUT DAMAGE TO NAIL, INITIAL ENCOUNTER: ICD-10-CM

## 2022-10-15 DIAGNOSIS — S80.02XA CONTUSION OF LEFT KNEE, INITIAL ENCOUNTER: Primary | ICD-10-CM

## 2022-10-15 PROCEDURE — 99284 EMERGENCY DEPT VISIT MOD MDM: CPT

## 2022-10-15 PROCEDURE — 93971 EXTREMITY STUDY: CPT | Performed by: PHYSICIAN ASSISTANT

## 2022-10-15 PROCEDURE — 73560 X-RAY EXAM OF KNEE 1 OR 2: CPT | Performed by: PHYSICIAN ASSISTANT

## 2022-10-15 PROCEDURE — 73562 X-RAY EXAM OF KNEE 3: CPT | Performed by: PHYSICIAN ASSISTANT

## 2022-10-15 RX ORDER — AMOXICILLIN AND CLAVULANATE POTASSIUM 875; 125 MG/1; MG/1
1 TABLET, FILM COATED ORAL 2 TIMES DAILY
Qty: 20 TABLET | Refills: 0 | Status: SHIPPED | OUTPATIENT
Start: 2022-10-15 | End: 2022-10-25

## 2022-10-15 RX ORDER — CEPHALEXIN 500 MG/1
500 CAPSULE ORAL 4 TIMES DAILY
Qty: 28 CAPSULE | Refills: 0 | Status: SHIPPED | OUTPATIENT
Start: 2022-10-15 | End: 2022-10-15 | Stop reason: ALTCHOICE

## 2022-10-15 NOTE — ED INITIAL ASSESSMENT (HPI)
Got up last Thursday without walker and fell and landed on both knees. Complains of pain to left knee is able to bear weight with walker.  States is bruised and blistered with swelling

## 2022-10-15 NOTE — ED PROVIDER NOTES
I reviewed that chart and discussed the case. I have examined the patient and noted 79-year-old female past medical history of osteoarthritis, hypertension, hypothyroidism on Eliquis for A. fib who presents for evaluation status post fall last Thursday. Patient got a bed without using her walker and fell landing on both knees. Since then she has had pain and swelling to the knees. GENERAL: Awake, alert oriented x3, nontoxic appearing. SKIN: Normal, warm, and dry. HEENT:  Pupils equally round and reactive to light. Conjuctiva clear. Oropharynx is clear and moist.   Lungs: Clear to auscultation bilaterally with no rales, no retractions, and no wheezing. HEART:  Regular rate and rhythm. S1 and S2. No murmurs, no rubs or gallops. ABDOMEN: Soft, nontender and nondistended. Normoactive bowel sounds. No rebound. No guarding. EXTREMITIES: Bilateral knee contusions. Left knee effusion. There are some erythema also on the left knee. There are some blisters on the left knee 1 of which is open. Bruising noted of the right ring finger. Patients can be started on antibiotics. Placed on Augmentin. She needs to have a wound check on Monday. Follow-up with primary care or return here. In addition, she needs to make an appoint with wound clinic. I did the substantive portion of the medical decision making. I agree with the following clinical impression(s):    Left knee contusion  Left knee cellulitis  I agree with the plan as noted. I have seen and examined the patient and agree with the assessment and plan as outlined in the documentation by the physician assistant. I provided a substantive portion of care for this patient. I personally performed the medical decision making for this encounter.

## 2022-10-17 ENCOUNTER — PATIENT OUTREACH (OUTPATIENT)
Dept: CASE MANAGEMENT | Age: 82
End: 2022-10-17

## 2022-10-17 ENCOUNTER — TELEMEDICINE (OUTPATIENT)
Dept: FAMILY MEDICINE CLINIC | Facility: CLINIC | Age: 82
End: 2022-10-17

## 2022-10-17 DIAGNOSIS — G30.9 ALZHEIMER'S DEMENTIA WITH MOOD DISTURBANCE, UNSPECIFIED DEMENTIA SEVERITY, UNSPECIFIED TIMING OF DEMENTIA ONSET (HCC): ICD-10-CM

## 2022-10-17 DIAGNOSIS — F02.83 ALZHEIMER'S DEMENTIA WITH MOOD DISTURBANCE, UNSPECIFIED DEMENTIA SEVERITY, UNSPECIFIED TIMING OF DEMENTIA ONSET (HCC): ICD-10-CM

## 2022-10-17 DIAGNOSIS — L03.116 LEFT LEG CELLULITIS: Primary | ICD-10-CM

## 2022-10-17 RX ORDER — DONEPEZIL HYDROCHLORIDE 5 MG/1
5 TABLET, FILM COATED ORAL NIGHTLY
Qty: 30 TABLET | Refills: 1 | Status: SHIPPED | OUTPATIENT
Start: 2022-10-17

## 2022-10-17 NOTE — PROGRESS NOTES
1st attempt Wound Clinic apt request    [address] [facility] [address] [phone]    Unable to contact patient and unable to [facility] as mailbox is full. Will try again.

## 2022-10-18 NOTE — PROGRESS NOTES
2nd attempt Wound Clinic apt request  [address] [facility] [id number] [phone]    Unable to contact patient and unable to [facility] as mailbox is full. Will try again.

## 2022-10-19 NOTE — PROGRESS NOTES
3rd attempt Wound Clinic apt request  [address]  [address] [facility] [address] [phone]    Unable to contact patient after multiple attempts and unable to LM as mailbox is full. No apt made. Closing encounter.

## 2022-11-16 RX ORDER — DONEPEZIL HYDROCHLORIDE 5 MG/1
TABLET, FILM COATED ORAL
Qty: 30 TABLET | Refills: 1 | Status: SHIPPED | OUTPATIENT
Start: 2022-11-16

## 2022-11-30 ENCOUNTER — TELEMEDICINE (OUTPATIENT)
Dept: FAMILY MEDICINE CLINIC | Facility: CLINIC | Age: 82
End: 2022-11-30
Payer: MEDICARE

## 2022-11-30 DIAGNOSIS — G30.9 ALZHEIMER'S DEMENTIA WITH MOOD DISTURBANCE, UNSPECIFIED DEMENTIA SEVERITY, UNSPECIFIED TIMING OF DEMENTIA ONSET (HCC): ICD-10-CM

## 2022-11-30 DIAGNOSIS — R05.9 COUGH, UNSPECIFIED TYPE: Primary | ICD-10-CM

## 2022-11-30 DIAGNOSIS — F02.83 ALZHEIMER'S DEMENTIA WITH MOOD DISTURBANCE, UNSPECIFIED DEMENTIA SEVERITY, UNSPECIFIED TIMING OF DEMENTIA ONSET (HCC): ICD-10-CM

## 2022-11-30 PROCEDURE — 99214 OFFICE O/P EST MOD 30 MIN: CPT | Performed by: FAMILY MEDICINE

## 2022-11-30 RX ORDER — MEMANTINE HYDROCHLORIDE 5 MG/1
5 TABLET ORAL 2 TIMES DAILY
Qty: 60 TABLET | Refills: 0 | Status: SHIPPED | OUTPATIENT
Start: 2022-11-30

## 2022-11-30 RX ORDER — METHYLPREDNISOLONE 4 MG/1
TABLET ORAL
Qty: 1 EACH | Refills: 0 | Status: SHIPPED | OUTPATIENT
Start: 2022-11-30

## 2022-11-30 RX ORDER — SOFT LENS DISINFECTANT
SOLUTION, NON-ORAL MISCELLANEOUS
Qty: 1 EACH | Refills: 0 | Status: SHIPPED | OUTPATIENT
Start: 2022-11-30

## 2022-11-30 RX ORDER — ALBUTEROL SULFATE 2.5 MG/3ML
2.5 SOLUTION RESPIRATORY (INHALATION) EVERY 6 HOURS PRN
Qty: 50 EACH | Refills: 0 | Status: SHIPPED | OUTPATIENT
Start: 2022-11-30

## 2022-11-30 RX ORDER — AZITHROMYCIN 250 MG/1
TABLET, FILM COATED ORAL
Qty: 6 TABLET | Refills: 0 | Status: SHIPPED | OUTPATIENT
Start: 2022-11-30 | End: 2022-12-05

## 2022-12-01 ENCOUNTER — TELEPHONE (OUTPATIENT)
Dept: FAMILY MEDICINE CLINIC | Facility: CLINIC | Age: 82
End: 2022-12-01

## 2022-12-01 NOTE — TELEPHONE ENCOUNTER
Received fax from pharmacy on Nebulizer equipment, product unavailable. Pharmacy requesting new prescription with diagnosis code for Medicare to cover equipment.

## 2022-12-21 RX ORDER — DONEPEZIL HYDROCHLORIDE 5 MG/1
TABLET, FILM COATED ORAL
Qty: 30 TABLET | Refills: 1 | Status: SHIPPED | OUTPATIENT
Start: 2022-12-21

## 2022-12-23 RX ORDER — MEMANTINE HYDROCHLORIDE 5 MG/1
TABLET ORAL
Qty: 60 TABLET | Refills: 0 | Status: SHIPPED | OUTPATIENT
Start: 2022-12-23

## 2023-01-15 RX ORDER — MEMANTINE HYDROCHLORIDE 5 MG/1
TABLET ORAL
Qty: 60 TABLET | Refills: 0 | Status: SHIPPED | OUTPATIENT
Start: 2023-01-15

## 2023-02-04 RX ORDER — MEMANTINE HYDROCHLORIDE 5 MG/1
TABLET ORAL
Qty: 60 TABLET | Refills: 0 | Status: SHIPPED | OUTPATIENT
Start: 2023-02-04

## 2023-03-12 RX ORDER — MEMANTINE HYDROCHLORIDE 5 MG/1
TABLET ORAL
Qty: 60 TABLET | Refills: 0 | Status: SHIPPED | OUTPATIENT
Start: 2023-03-12

## 2023-03-27 ENCOUNTER — PATIENT MESSAGE (OUTPATIENT)
Dept: FAMILY MEDICINE CLINIC | Facility: CLINIC | Age: 83
End: 2023-03-27

## 2023-03-27 RX ORDER — AMLODIPINE BESYLATE 5 MG/1
5 TABLET ORAL DAILY
Qty: 90 TABLET | Refills: 1 | Status: SHIPPED | OUTPATIENT
Start: 2023-03-27

## 2023-03-27 NOTE — TELEPHONE ENCOUNTER
From: Khris Schwartz  To: Kristina Cabrera MD  Sent: 3/27/2023 5:05 PM CDT  Subject: Medication refill ASAP    This message is being sent by Gena Hidalgo on behalf of Khris Schwartz. Amlodipine besylate 5 mg. will run out this Friday 3/31/2023. Please provide [facility] pharmacy on file with required information so it may be refilled as soon as possible to prevent any doses from being missed. Thank you.

## 2023-04-16 RX ORDER — MEMANTINE HYDROCHLORIDE 5 MG/1
TABLET ORAL
Qty: 60 TABLET | Refills: 0 | Status: SHIPPED | OUTPATIENT
Start: 2023-04-16

## 2023-04-19 ENCOUNTER — VIRTUAL PHONE E/M (OUTPATIENT)
Dept: FAMILY MEDICINE CLINIC | Facility: CLINIC | Age: 83
End: 2023-04-19
Payer: MEDICARE

## 2023-04-19 DIAGNOSIS — U07.1 COVID-19: Primary | ICD-10-CM

## 2023-04-19 PROCEDURE — 99442 PHONE E/M BY PHYS 11-20 MIN: CPT | Performed by: FAMILY MEDICINE

## 2023-04-19 RX ORDER — ALPRAZOLAM 0.25 MG/1
0.25 TABLET ORAL EVERY 6 HOURS PRN
Qty: 30 TABLET | Refills: 1 | Status: SHIPPED | OUTPATIENT
Start: 2023-04-19

## 2023-04-19 NOTE — PATIENT INSTRUCTIONS
Thank you for choosing Angel Obando MD at [place] [address]  To Do: Dannielle GARCIA Wesdominga  1. Please take meds as directed. Billy Flanagan is located in [address]. Monday, Tuesday & Friday - 8 a.m. to 4 p.m. Wednesday, Thursday - 7 a.m. to 3 p.m. The lab is closed daily from 12 p.m.-12:30 p.m. Saturday lab hours by appointment. Call [phone] to schedule the appointment. Please signup for Sonico, which is electronic access to your record if you have not done so. All your results will post on there. [email or web address]   You can NOW use Sonico to book your appointments with us, or consider using open access scheduling which is through the edward website [email or web address] and type in Angel Obando MD and follow the links for \"Schedule Online Now\"    To schedule Imaging or tests at [facility] Scheduling [phone], Go to [facility] A ER Building (For example: CT scans, X rays, Ultrasound, MRI)  Cardiac Testing in ER building Building A second floor Cardiac Testing [phone] (For example: Holter Monitor, Cardiac Stress tests,Event Monitor, or 2D Echocardiograms)  [facility] call [phone] usually in [facility] A  Walk in Clinic in [facility] at [facility]. [address] Mon-Fri at 8am-7:30 p.m., and Sat/Sun 9:00a. m.-4:30 p.m. Also at [address] [employer]  Call [phone] for info     Please call our office about any questions regarding your treatment/medicines/tests as a result of today's visit. For your safety, read the entire package insert of all medicines prescribed to you and be aware of all of the risks of treatment even beyond those discussed today. All therapies have potential risk of harm or side effects or medication interactions.   It is your duty and for your safety to discuss with the pharmacist and our office with questions, and to notify us and stop treatment if problems arise, but know that our intention is that the benefits outweigh those potential risks and we strive to make you healthier and to improve your quality of life. Referrals, and Radiology Information:    If your insurance requires a referral to a specialist, please allow 5 business days to process your referral request.    If Mallika Henderson MD orders a CT or MRI, it may take up to 10 business days to receive approval from your insurance company. Once our office has called informing you that the insurance company approved your testing, please call Central Scheduling at [phone]  Please allow our office 5 business days to contact you regarding any testing results. Refill policies:   Allow 3 business days for refills; controlled substances may take longer and must be picked up from the office in person. Narcotic medications can only be filled in 30 day increments and must be refilled at an office visit only. If your prescription is due for a refill, you may be due for a follow-up appointment. We cannot refill your maintenance medications at a preventative wellness visit. To best provide you care, patients receiving maintenance medications need to be seen at least twice a year.

## 2023-04-19 NOTE — PROGRESS NOTES
Virtual Telephone Check-In    Jacki Iyer verbally consents to a Virtual/Telephone Check-In visit on 04/19/23. Patient has been referred to the [facility] website at [email or web address] to review the yearly Consent to Treat document. Patient understands and accepts financial responsibility for any deductible, co-insurance and/or co-pays associated with this service.     Duration of the service: 15 minutes      Summary of topics discussed:             Rafia Jackson MD

## 2023-05-08 RX ORDER — AMIODARONE HYDROCHLORIDE 200 MG/1
TABLET ORAL
Qty: 90 TABLET | Refills: 0 | Status: SHIPPED | OUTPATIENT
Start: 2023-05-08

## 2023-05-11 RX ORDER — MEMANTINE HYDROCHLORIDE 5 MG/1
TABLET ORAL
Qty: 60 TABLET | Refills: 0 | Status: SHIPPED | OUTPATIENT
Start: 2023-05-11

## 2023-05-15 RX ORDER — LEVOTHYROXINE SODIUM 0.1 MG/1
TABLET ORAL
Qty: 90 TABLET | Refills: 3 | Status: SHIPPED | OUTPATIENT
Start: 2023-05-15

## 2023-06-08 RX ORDER — MEMANTINE HYDROCHLORIDE 5 MG/1
TABLET ORAL
Qty: 60 TABLET | Refills: 0 | Status: SHIPPED | OUTPATIENT
Start: 2023-06-08

## 2023-06-14 ENCOUNTER — TELEPHONE (OUTPATIENT)
Dept: FAMILY MEDICINE CLINIC | Facility: CLINIC | Age: 83
End: 2023-06-14

## 2023-07-12 RX ORDER — MEMANTINE HYDROCHLORIDE 5 MG/1
5 TABLET ORAL 2 TIMES DAILY
Qty: 60 TABLET | Refills: 0 | Status: SHIPPED | OUTPATIENT
Start: 2023-07-12

## 2023-07-13 ENCOUNTER — TELEPHONE (OUTPATIENT)
Dept: FAMILY MEDICINE CLINIC | Facility: CLINIC | Age: 83
End: 2023-07-13

## 2023-07-13 NOTE — TELEPHONE ENCOUNTER
Patient still trying to recover, has fluid in her lungs, was trying to schedule her Medicare AWV, they will call when she is more mobile.

## 2023-08-11 RX ORDER — AMIODARONE HYDROCHLORIDE 200 MG/1
200 TABLET ORAL DAILY
Qty: 90 TABLET | Refills: 0 | Status: SHIPPED | OUTPATIENT
Start: 2023-08-11

## 2023-09-14 RX ORDER — AMLODIPINE BESYLATE 5 MG/1
5 TABLET ORAL DAILY
Qty: 90 TABLET | Refills: 1 | Status: SHIPPED | OUTPATIENT
Start: 2023-09-14

## 2023-10-12 ENCOUNTER — LAB REQUISITION (OUTPATIENT)
Dept: LAB | Facility: HOSPITAL | Age: 83
End: 2023-10-12
Payer: MEDICARE

## 2023-10-12 DIAGNOSIS — R74.8 ABNORMAL LEVELS OF OTHER SERUM ENZYMES: ICD-10-CM

## 2023-10-12 DIAGNOSIS — Z13.228 ENCOUNTER FOR SCREENING FOR OTHER METABOLIC DISORDERS: ICD-10-CM

## 2023-10-12 DIAGNOSIS — J96.91: ICD-10-CM

## 2023-10-12 LAB
AFP-TM SERPL-MCNC: 3.8 NG/ML (ref ?–8)
TROPONIN I HIGH SENSITIVITY: 11 NG/L

## 2023-10-12 PROCEDURE — 82105 ALPHA-FETOPROTEIN SERUM: CPT | Performed by: FAMILY MEDICINE

## 2023-10-12 PROCEDURE — 84484 ASSAY OF TROPONIN QUANT: CPT | Performed by: FAMILY MEDICINE

## 2023-10-13 ENCOUNTER — LAB REQUISITION (OUTPATIENT)
Dept: LAB | Facility: HOSPITAL | Age: 83
End: 2023-10-13
Payer: COMMERCIAL

## 2023-10-13 DIAGNOSIS — J96.91: ICD-10-CM

## 2023-10-13 DIAGNOSIS — R79.89 OTHER SPECIFIED ABNORMAL FINDINGS OF BLOOD CHEMISTRY: ICD-10-CM

## 2023-10-13 DIAGNOSIS — Z13.228 ENCOUNTER FOR SCREENING FOR OTHER METABOLIC DISORDERS: ICD-10-CM

## 2023-10-13 DIAGNOSIS — R74.8 ABNORMAL LEVELS OF OTHER SERUM ENZYMES: ICD-10-CM

## 2023-10-13 LAB
BILIRUB UR QL STRIP.AUTO: NEGATIVE
COLOR UR AUTO: YELLOW
GLUCOSE UR STRIP.AUTO-MCNC: 50 MG/DL
HYALINE CASTS #/AREA URNS AUTO: PRESENT /LPF
KETONES UR STRIP.AUTO-MCNC: NEGATIVE MG/DL
LEUKOCYTE ESTERASE UR QL STRIP.AUTO: 250
NITRITE UR QL STRIP.AUTO: NEGATIVE
PH UR STRIP.AUTO: 7.5 [PH] (ref 5–8)
PROT UR STRIP.AUTO-MCNC: 20 MG/DL
RBC #/AREA URNS AUTO: >10 /HPF
SP GR UR STRIP.AUTO: 1.02 (ref 1–1.03)
UROBILINOGEN UR STRIP.AUTO-MCNC: NORMAL MG/DL
WBC #/AREA URNS AUTO: >50 /HPF

## 2023-10-13 PROCEDURE — 87086 URINE CULTURE/COLONY COUNT: CPT | Performed by: FAMILY MEDICINE

## 2023-10-13 PROCEDURE — 87077 CULTURE AEROBIC IDENTIFY: CPT | Performed by: FAMILY MEDICINE

## 2023-10-13 PROCEDURE — 87186 SC STD MICRODIL/AGAR DIL: CPT | Performed by: FAMILY MEDICINE

## 2023-10-13 PROCEDURE — 81001 URINALYSIS AUTO W/SCOPE: CPT | Performed by: FAMILY MEDICINE

## 2023-11-13 NOTE — TELEPHONE ENCOUNTER
Sent [facility] for pt to call office to schedule Medicare Annual Wellness exam.

## 2023-11-13 NOTE — TELEPHONE ENCOUNTER
Requesting Amiodarone 200mg  LOV: 4/19/23 VV acute  RTC: prn  Last Relevant Labs:   Filled: 8/11/23 #90 with 0 refills    No future appointments. Patient hasn't had an in office appointment since 6/28/22. Please schedule pt.

## 2023-11-14 ENCOUNTER — PATIENT OUTREACH (OUTPATIENT)
Dept: CASE MANAGEMENT | Age: 83
End: 2023-11-14

## 2023-11-14 RX ORDER — AMIODARONE HYDROCHLORIDE 200 MG/1
200 TABLET ORAL DAILY
Qty: 90 TABLET | Refills: 0 | OUTPATIENT
Start: 2023-11-14

## 2023-11-14 NOTE — PROCEDURES
The office order for PCP removal request is Approved and finalized on November 14, 2023.     Thanks,  [facility] [employer]

## 2024-10-18 NOTE — PROGRESS NOTES
[facility] Patient Status:  Inpatient    1940 MRN [id number]   [facility] 5NW-A Attending Anny Latham MD   Hosp Day # 1 PCP Sumaya Caldera MD     SUBJECTIVE: Pt with increasing oxygen requirements overnigh (DITROPAN-XL) 24 hr tab 15 mg, 15 mg, Oral, Daily  •  Piperacillin Sod-Tazobactam So (ZOSYN) 3.375 g in dextrose 5% 100 mL IVPB-MBP, 3.375 g, Intravenous, Q8H  •  norepinephrine (LEVOPHED) 4 mg/250 ml premix infusion, 0.5-30 mcg/min, Intravenous, Continuou 01/04/22  0723   CRP 4.99* 5.04* 5.41*   ERIC 657.8* 638.7* 518.2*   * 317* 331*   DDIMER 1.53* 1.17* 0.85*       Imaging: I have independently visualized all relevant chest imaging in PACS.   I agree with the radiology interpretation except where not - 2/2 long term use of prednisone  - c/w prednisone 5mg daily

## (undated) DIAGNOSIS — Z74.1 REQUIRES ASSISTANCE WITH ACTIVITIES OF DAILY LIVING (ADL): ICD-10-CM

## (undated) DIAGNOSIS — J98.01 BRONCHOSPASM: Primary | ICD-10-CM

## (undated) DIAGNOSIS — R06.2 WHEEZING: ICD-10-CM

## (undated) DIAGNOSIS — I48.0 PAF (PAROXYSMAL ATRIAL FIBRILLATION) (HCC): ICD-10-CM

## (undated) DIAGNOSIS — R53.1 WEAKNESS: ICD-10-CM

## (undated) DIAGNOSIS — E06.3 HYPOTHYROIDISM DUE TO HASHIMOTO'S THYROIDITIS: ICD-10-CM

## (undated) DIAGNOSIS — I10 ESSENTIAL HYPERTENSION, BENIGN: ICD-10-CM

## (undated) DIAGNOSIS — I48.0 PAF (PAROXYSMAL ATRIAL FIBRILLATION) (HCC): Primary | ICD-10-CM

## (undated) DIAGNOSIS — J40 BRONCHITIS: ICD-10-CM

## (undated) DIAGNOSIS — E03.8 HYPOTHYROIDISM DUE TO HASHIMOTO'S THYROIDITIS: ICD-10-CM

## (undated) DIAGNOSIS — B37.0 ORAL CANDIDIASIS: ICD-10-CM

## (undated) DIAGNOSIS — R09.02 HYPOXEMIA: ICD-10-CM

## (undated) NOTE — IP AVS SNAPSHOT
[address]            (For Outpatient Use Only) Initial Admit Date: 2022   Inpt/Obs Admit Date: Inpt: 22 / Obs: N/A   Discharge Date:    Yohana Patel:  [de-identified]   MRN: [de-identified]   CSN: [id number]   CEID: [id number]        ENCOUNTER  Patient Class: Inpatient Admitting Provider: Taylor Torres MD Unit: [place] [address] Service: Medical Attending Provider: Ernesto Guallpa MD   Bed: 506-A   Visit Type:   Referring Physician: No ref. provider found Billing Flag:    Admit Diagnosis: Wheezing [R06.2]      PATIENT  Legal Name:   Fran Velez   Legal Sex: Female  Gender ID: Female             Pref Name:   Kelvin Willard PCP:  Hailee Rojas MD Home: [phone]   Address:  [address] [place] : 1940 (81 yrs) Mobile: [phone]         City/[facility]/Zip: [place] [address] Marital:  Language: [address] [place] [address] Drive: [facility] SSN4: [id number] Mu-ism: Rastafarian - No Visit     Race: White Ethnicity: Non  Or  O*   EMERGENCY CONTACT   Name Relationship Legal Guardian? Home Phone Work Phone Mobile Phone   1. Vicki Botello  2.  Allison Scott Daughter  Daughter      [phone]  [phone]     GUARANTOR  Guarantor: Massiel GARCIA : 1940 Home Phone: [phone]   Address: [address]   Sex: Female Work Phone:    City/State/Zip: [address]   Rel. to Patient: Self Guarantor ID: [id number]   [address]   Employer:  Status: RETIRED     COVERAGE  PRIMARY INSURANCE   Payor: MEDICARE Plan: MEDICARE PART A&B   Group Number:  Insurance Type: INDEMNITY   Subscriber Name: Soledad Garza : 1940   Subscriber ID: [id number] Pt Rel to Subscriber: Self   SECONDARY INSURANCE   Payor: [place] PPO Plan: [employer] [facility] PPO   Group Number: [id number] Type: [address] Name: Soledad Garza : 1940   Subscriber ID: [id number] Pt Rel to Subscriber: SELF   TERTIARY INSURANCE   Payor:  Plan:    Group Number:  Insurance Type:    Subscriber Name:  Subscriber :    Subscriber ID:  Pt Rel to Subscriber:    Hospital Account Financial Class: Medicare    2022

## (undated) NOTE — IP AVS SNAPSHOT
Patient Demographics     Address  [address] [place] [id number] Phone  [phone] [facility])  [phone] (Mobile) *Preferred* E-mail Address  [email or web address]      Emergency Contact(s)     Name Relation Home Work [address] mg total) by mouth daily. Kp Jacques MD         levothyroxine 100 MCG Tabs  Commonly known as: SYNTHROID  Next dose due: 1/12 Morning      Take 1 tablet (100 mcg total) by mouth daily.    Kp Jacques MD         omeprazole 20 MG Cpdr  Co suspension 500,000 Units 01/11/22 0841 Given      [id number] nystatin (MYCOSTATIN) suspension 500,000 Units 01/11/22 1237 Given      [id number] nystatin (MYCOSTATIN) suspension 500,000 Units 01/11/22 1657 Given      [id number] pantoprazole (PROTONIX) EC tab 2 Patient presents with:  Dehydration       PCP: Jacob Jules MD    History of Present Illness: Patient is a 79year old female with PMH including but not limited to HTN, HT who p/t [facility] ED c fever, weakness, found ot have covid and hypotension. Nose: Nares normal,  Mucosa normal    Throat: Lips normal   Neck: Supple, symmetrical, trachea midline   Lungs:   Clear to auscultation bilaterally.  Normal effort   Chest wall:  No tenderness or deformity   Heart:  Regular rate and rhythm, S1, S2 normal, Mild diffuse volume loss. There is no significant parenchymal attenuation abnormality. There is no midline shift or mass-effect. The basal cisterns are patent. The gray-white matter differentiation is intact.   There is no acute intracranial hemorrhage entirely excluded. Clinical correlation recommended. Possible small left pleural effusion.      Dictated by (CST): Ross Henderson MD on 1/02/2022 at 11:50 PM     Finalized by (CST): Ross Henderson MD on 1/02/2022 at 11:50 PM            ASSESSMENT / PLAN Avril George MD Service: Infectious Disease Author Type: Physician    Filed: 1/7/2022 11:16 PM Date of Service: 1/7/2022 11:15 PM Status: Signed    : Avril George MD (Physician)       [address] [place] [address]  TEL: [phone] mg, 600 mg, Oral, BID  •  sodium chloride 0.9% IV bolus 500 mL, 500 mL, Intravenous, PRN  •  albuterol 108 (90 Base) MCG/ACT inhaler 4 puff, 4 puff, Inhalation, Q4H PRN  •  benzonatate (TESSALON) cap 200 mg, 200 mg, Oral, TID PRN  •  acetaminophen (TYLENOL 25 01/07/2022     01/07/2022    K 5.0 01/07/2022     01/07/2022    CO2 21.0 01/07/2022     01/07/2022    CA 8.5 01/07/2022    ALB 2.1 01/07/2022    ALKPHO 128 01/07/2022    BILT 0.7 01/07/2022    TP 6.5 01/07/2022    AST 71 01/07/2022 stop zosyn now.                  Electronically signed by Chris Jeffers MD on 1/7/2022 11:16 PM              Discharge Summary - D/C Summary      Discharge Summary signed by Padmini Martel MD at 1/11/2022  4:34 PM  Version 1 of 1    Author: Padmini Martel, [facility] stopped. Initially was on heparin gtt for elevated ddimer in this setting but LE dopplers and CTA chest neg. Did develop afib and was started on eliquis, amiodarone.  Amlodipine was restarted for bp control but hydrochlorothiazide and benazepril were stoppe [address] [facility]    Phone: [phone]   · amiodarone 200 MG Tabs  · amLODIPine 5 MG Tabs  · apixaban 5 MG Tabs       Consults: IP CONSULT TO HOSPITALIST  IP CONSULT TO PULMONOLOGY  IP CONSULT TO INFECTIOUS DISEASE  IP CONSULT TO SOCIAL WORK  IP CONSUL COMPARISON:  [place], CT, CTA CHEST, 12/16/2012, 4:53 AM.  INDICATIONS:  worsening hypoxia, elevated d-dimer, (+)COVID  TECHNIQUE:  IV contrast-enhanced multislice CT angiography is performed through the pulmonary arterial anatomy.  3D volume renderings None.  INDICATIONS:  swelling, r/o dvt  TECHNIQUE:  The usual exam was modified secondary to Covid 19 considerations. Real time grey scale imaging and compression sonography was utilized to evaluate both lower extremity venous systems.   B-mode two-dimensio CONCLUSION:  Patchy interstitial and ground-glass opacities with patchy consolidation scattered in the bilateral lungs, most pronounced in the mid to lower left lung are concerning for atypical pneumonia from COVID-19 infection, with other etiologie assessment of valvular function. Image quality was suboptimal. The study was technically limited due to poor acoustic window availability and body habitus. Intravenous contrast (Definity) was administered to opacify the LV.  ECG rhythm:   Atrial fibrillatio was no stenosis. No regurgitation. Tricuspid valve:   Structurally normal valve. Leaflet separation was normal.  Doppler:  Transvalvular velocity was within the normal range. There was no evidence for stenosis. There was mild-moderate regurgitation.  Pul A-P, ES                          (H)     5.0   cm     2.7 - 3.8  LA ID/bsa, A-P                          (H)     2.5   cm/m^2 1. 5 - 2.3  LA volume, S                            (H)     84    ml     22 - 52  LA volume/bsa, S                        (H)     4 PM              Physical Therapy Notes (last 72 hours)      Physical Therapy Note signed by Sunil Gallardo PT at 1/11/2022  1:46 PM  Version 1 of 1    Author: Sunil Gallardo PT Service: Rehab Author Type: Physical Therapist    Filed: 1/11/2022  1:46 PM Goal #6     Goal Comments: Goals established on 1/4/2022 1/11/2022 all goals ongoing      SUBJECTIVE    Pt demonstrating less fear of falling this session, denies SOB or pain.     OBJECTIVE  Precautions: Bed/chair alarm;Hard of hearing    WEIGHT ESPERANZA a   Sit<>Supine: nt     Transfer Mobility:  Sit<>Stand: mod S>>min a inconsistently  Stand<>Sit: min a   Gait: pt able to take steps with rw to transfer bed to chair approx 3', shuffling gait pattern, but able to move bilat LE for transfer.     Therapist's functional level and would benefit from skilled inpatient OT to address the above deficits, maximizing patient’s ability to return safely to her prior level of function.     OT Discharge Recommendations: Sub-acute rehabilitation  OT Device Recommendations: within reach;RN aware of session/findings; All patient questions and concerns addressed; Alarm set    SUBJECTIVE  \"I will try to eat. I'm not hungry. \"    PAIN ASSESSMENT  Ratin  Location: denies        OBJECTIVE  Precautions: Bed/chair alarm;Hard of h N95    Patient/family PPE: Mask not worn                 Video Swallow Study Notes    No notes of this type exist for this encounter. SLP Notes    No notes of this type exist for this encounter.      Immunizations     Name Date      [facility] [employer] 04/0 interventions

## (undated) NOTE — IP AVS SNAPSHOT
[address]            (For Outpatient Use Only) Initial Admit Date: 1/2/2022   Inpt/Obs Admit Date: Inpt: 1/3/22 / Obs: N/A   Discharge Date:    Darlene Poles:  [de-identified]   MRN: [de-identified]   CSN: [id number]   [address]: [id number] INSURANCE   Payor:  Plan:    Group Number:  Insurance Type:    Subscriber Name:  Subscriber :    Subscriber ID:  Pt Rel to Subscriber:    Hospital Account Financial Class: Medicare Advantage    2022